# Patient Record
Sex: FEMALE | Race: ASIAN | NOT HISPANIC OR LATINO | Employment: FULL TIME | URBAN - METROPOLITAN AREA
[De-identification: names, ages, dates, MRNs, and addresses within clinical notes are randomized per-mention and may not be internally consistent; named-entity substitution may affect disease eponyms.]

---

## 2017-03-07 ENCOUNTER — TELEPHONE (OUTPATIENT)
Dept: INTERNAL MEDICINE | Facility: CLINIC | Age: 52
End: 2017-03-07

## 2017-03-07 NOTE — LETTER
[facility]  [address], [address]  [place]  [address]      March 7, 2017    Martir Toth                                                                                                                                                       [address]  [place] [address]              Dear Martir,    After reviewing your chart, we show there are a few things you are due for: a mammogram and a colonoscopy.   When we had spoke in the past, you had your mammogram scheduled at [facility] and were going to ask them to send us the results. Did you ever have this done? We did not receive any results from them, but if you are able to provide us with the month and year, and let us know if it was normal or abnormal, then we can update your chart with that information.   As far as your colonoscopy, we know you were waiting on that one until your life was a little less hectic. Is this something you feel you could do now?   If you are ok with having this done, please let us know and we can help you get this scheduled. Our office can be reached at [phone]. Thank you!      Sincerely,      The Office of Re Bender M.D.

## 2017-03-07 NOTE — TELEPHONE ENCOUNTER
Panel Management Review      Patient has the following on her problem list:     Hypertension   Last three blood pressure readings:  BP Readings from Last 3 Encounters:   06/28/16 138/80   05/18/15 118/70   10/09/14 128/70     Blood pressure: Passed    HTN Guidelines:  Age 18-59 BP range:  Less than 140/90  Age 60-85 with Diabetes:  Less than 140/90  Age 60-85 without Diabetes:  less than 150/90      Composite cancer screening  Chart review shows that this patient is due/due soon for the following Mammogram and Colonoscopy  Summary:    Patient is due/failing the following:   COLONOSCOPY and MAMMOGRAM    Action needed:   Needs to complete mammogram and colonoscopy. When we spoke to her last June 2016, she said she had mammogram scheduled at [facility] and would ask them to send us the record after she had this done. She also said she was ok with having colonoscopy, but she was taking care of her mother and did not think she would be able to do it for several months.    Type of outreach:    Sent letter.    Questions for provider review:    None                                                                                                                                    Nina Arrieta [facility]       Chart not routed.

## 2017-07-20 DIAGNOSIS — I10 ESSENTIAL HYPERTENSION WITH GOAL BLOOD PRESSURE LESS THAN 140/90: ICD-10-CM

## 2017-07-20 DIAGNOSIS — N18.1 CKD (CHRONIC KIDNEY DISEASE) STAGE 1, GFR 90 ML/MIN OR GREATER: ICD-10-CM

## 2017-07-20 RX ORDER — LISINOPRIL 10 MG/1
TABLET ORAL
Qty: 30 TABLET | Refills: 0 | Status: SHIPPED | OUTPATIENT
Start: 2017-07-20 | End: 2017-09-25

## 2017-07-20 NOTE — TELEPHONE ENCOUNTER
Lisinopril       Last Written Prescription Date: 6/28/16  Last Fill Quantity: 90, # refills: 3  Last Office Visit with [facility], [facility] or [facility] prescribing provider: 6/28/16       Potassium   Date Value Ref Range Status   06/28/2016 3.8 3.4 - 5.3 mmol/L Final     Creatinine   Date Value Ref Range Status   06/28/2016 0.55 0.52 - 1.04 mg/dL Final     BP Readings from Last 3 Encounters:   06/28/16 138/80   05/18/15 118/70   10/09/14 128/70     Medication is being filled for 1 time refill only due to:  Patient needs to be seen because over due for OV and Labs.     Writer contacted  services to assist in scheduling an appointment and updating patient about the medication refill. Appointment for lab and office visit scheduled for 7/31/17. Patient verbalized understanding. Labs updated.     MACK Kat

## 2017-09-25 ENCOUNTER — OFFICE VISIT (OUTPATIENT)
Dept: INTERNAL MEDICINE | Facility: CLINIC | Age: 52
End: 2017-09-25
Payer: COMMERCIAL

## 2017-09-25 VITALS
OXYGEN SATURATION: 99 % | HEART RATE: 68 BPM | WEIGHT: 114.6 LBS | DIASTOLIC BLOOD PRESSURE: 72 MMHG | TEMPERATURE: 97.7 F | BODY MASS INDEX: 21.09 KG/M2 | HEIGHT: 62 IN | SYSTOLIC BLOOD PRESSURE: 110 MMHG

## 2017-09-25 DIAGNOSIS — Z12.11 SPECIAL SCREENING FOR MALIGNANT NEOPLASMS, COLON: ICD-10-CM

## 2017-09-25 DIAGNOSIS — N18.1 CKD (CHRONIC KIDNEY DISEASE) STAGE 1, GFR 90 ML/MIN OR GREATER: ICD-10-CM

## 2017-09-25 DIAGNOSIS — I10 HTN, GOAL BELOW 140/90: Primary | ICD-10-CM

## 2017-09-25 LAB
ERYTHROCYTE [DISTWIDTH] IN BLOOD BY AUTOMATED COUNT: 12.6 % (ref 10–15)
HCT VFR BLD AUTO: 41.5 % (ref 35–47)
HGB BLD-MCNC: 13.5 G/DL (ref 11.7–15.7)
MCH RBC QN AUTO: 30.4 PG (ref 26.5–33)
MCHC RBC AUTO-ENTMCNC: 32.5 G/DL (ref 31.5–36.5)
MCV RBC AUTO: 94 FL (ref 78–100)
PLATELET # BLD AUTO: 209 10E9/L (ref 150–450)
RBC # BLD AUTO: 4.44 10E12/L (ref 3.8–5.2)
WBC # BLD AUTO: 4.6 10E9/L (ref 4–11)

## 2017-09-25 PROCEDURE — 82043 UR ALBUMIN QUANTITATIVE: CPT | Performed by: INTERNAL MEDICINE

## 2017-09-25 PROCEDURE — 80061 LIPID PANEL: CPT | Performed by: INTERNAL MEDICINE

## 2017-09-25 PROCEDURE — 85027 COMPLETE CBC AUTOMATED: CPT | Performed by: INTERNAL MEDICINE

## 2017-09-25 PROCEDURE — 36415 COLL VENOUS BLD VENIPUNCTURE: CPT | Performed by: INTERNAL MEDICINE

## 2017-09-25 PROCEDURE — 99213 OFFICE O/P EST LOW 20 MIN: CPT | Performed by: INTERNAL MEDICINE

## 2017-09-25 PROCEDURE — 80053 COMPREHEN METABOLIC PANEL: CPT | Performed by: INTERNAL MEDICINE

## 2017-09-25 RX ORDER — LISINOPRIL 10 MG/1
10 TABLET ORAL DAILY
Qty: 90 TABLET | Refills: 1 | Status: SHIPPED | OUTPATIENT
Start: 2017-09-25 | End: 2018-04-23

## 2017-09-25 NOTE — MR AVS SNAPSHOT
After Visit Summary   9/25/2017    Martir Toth    MRN: [id number]           Patient Information     Date Of Birth          1965        Visit Information        Provider Department      9/25/2017 8:45 AM Re Garrett MD; [employer] [facility]        Today's Diagnoses     HTN, goal below 140/90    -  1    Special screening for malignant neoplasms, colon        Essential hypertension with goal blood pressure less than 140/90        CKD (chronic kidney disease) stage 1, GFR 90 ml/min or greater          Care Instructions    Plan:  1.  Labs today   2. Continue same meds, same doses for now   3. Last pap and mammogram 12/6/2016  3. Colonoscopy  Appt. Line [phone].           Follow-ups after your visit        Additional Services     GASTROENTEROLOGY ADULT REF PROCEDURE ONLY       Last Lab Result: Creatinine (mg/dL)       Date                     Value                 06/28/2016               0.55             ----------  Body mass index is 20.96 kg/(m^2).      Patient will be contacted to schedule procedure.     Please be aware that coverage of these services is subject to the terms and limitations of your health insurance plan.  Call member services at your health plan with any benefit or coverage questions.  Any procedures must be performed at a [facility] facility OR coordinated by your clinic's referral office.    Please bring the following with you to your appointment:    (1) Any X-Rays, CTs or MRIs which have been performed.  Contact the facility where they were done to arrange for  prior to your scheduled appointment.    (2) List of current medications   (3) This referral request   (4) Any documents/labs given to you for this referral                  Who to contact     If you have questions or need follow up information about today's clinic visit or your schedule please contact [facility] directly at  "[phone].  Normal or non-critical lab and imaging results will be communicated to you by Tiny Pictureshart, letter or phone within 4 business days after the clinic has received the results. If you do not hear from us within 7 days, please contact the clinic through [employer] Pictures[employer] or phone. If you have a critical or abnormal lab result, we will notify you by phone as soon as possible.  Submit refill requests through zuuka! or call your pharmacy and they will forward the refill request to us. Please allow 3 business days for your refill to be completed.          Additional Information About Your Visit        Tiny Pictureshart Information     zuuka! lets you send messages to your doctor, view your test results, renew your prescriptions, schedule appointments and more. To sign up, go to [email or web address] . Click on \"Log in\" on the left side of the screen, which will take you to the Welcome page. Then click on \"Sign up Now\" on the right side of the page.     You will be asked to enter the access code listed below, as well as some personal information. Please follow the directions to create your username and password.     Your access code is: [email or web address]  Expires: 10/29/2017  9:55 AM     Your access code will  in 90 days. If you need help or a new code, please call your [facility] clinic or [phone].        Care EveryWhere ID     This is your Care EveryWhere ID. This could be used by other organizations to access your [facility] medical records  [id number]        Your Vitals Were     Pulse Temperature Height Pulse Oximetry Breastfeeding? BMI (Body Mass Index)    68 97.7  F (36.5  C) (Oral) 5' 2\" (1.575 m) 99% No 20.96 kg/m2       Blood Pressure from Last 3 Encounters:   17 110/72   16 138/80   05/18/15 118/70    Weight from Last 3 Encounters:   17 114 lb 9.6 oz (52 kg)   16 113 lb (51.3 kg)   05/18/15 113 lb 9.6 oz (51.5 kg)              We Performed the Following     Albumin Random Urine Quantitative " with Creat Ratio     CBC with platelets     Comprehensive metabolic panel     GASTROENTEROLOGY ADULT REF PROCEDURE ONLY     Lipid panel reflex to direct LDL          Today's Medication Changes          These changes are accurate as of: 9/25/17  9:31 AM.  If you have any questions, ask your nurse or doctor.               These medicines have changed or have updated prescriptions.        Dose/Directions    lisinopril 10 MG tablet   Commonly known as:  PRINIVIL/ZESTRIL   This may have changed:  See the new instructions.   Used for:  Essential hypertension with goal blood pressure less than 140/90, CKD (chronic kidney disease) stage 1, GFR 90 ml/min or greater   Changed by:  Re Garrett MD        Dose:  10 mg   Take 1 tablet (10 mg) by mouth daily   Quantity:  90 tablet   Refills:  1            Where to get your medicines      These medications were sent to [id number] - [place], [place] - [address]  [address] [address]     Phone:  [phone]     lisinopril 10 MG tablet                Primary Care Provider Office Phone # Fax #    Re Garrett [phone]       [address] [facility] [address]        Equal Access to Services     [facility] MICHAEL : Hadii aad ku hadasho Soomaali, waaxda luqadaha, qaybta kaalmada adeegyada, mai lucero. So [facility] [phone].    ATENCIÓN: Si habla español, tiene a velasquez disposición servicios gratuitos de asistencia lingüística. Tahira al [phone].    We comply with applicable federal civil rights laws and [place] laws. We do not discriminate on the basis of race, color, national origin, age, disability sex, sexual orientation or gender identity.            Thank you!     Thank you for choosing [facility]  for your care. Our goal is always to provide you with excellent care. Hearing back from our patients is one way we can continue to improve our  services. Please take a few minutes to complete the written survey that you may receive in the mail after your visit with us. Thank you!             Your Updated Medication List - Protect others around you: Learn how to safely use, store and throw away your medicines at www.disposemymeds.org.          This list is accurate as of: 9/25/17  9:31 AM.  Always use your most recent med list.                   Brand Name Dispense Instructions for use Diagnosis    cetirizine-psuedoePHEDrine 5-120 MG per 12 hr tablet    zyrTEC-D    90 tablet    Take 1 tablet by mouth 2 times daily    Other allergic rhinitis       lisinopril 10 MG tablet    PRINIVIL/ZESTRIL    90 tablet    Take 1 tablet (10 mg) by mouth daily    Essential hypertension with goal blood pressure less than 140/90, CKD (chronic kidney disease) stage 1, GFR 90 ml/min or greater

## 2017-09-25 NOTE — PATIENT INSTRUCTIONS
Plan:  1.  Labs today   2. Continue same meds, same doses for now   3. Last pap and mammogram 12/6/2016  3. Colonoscopy  Appt. Line [phone].

## 2017-09-25 NOTE — NURSING NOTE
"Chief Complaint   Patient presents with     Recheck Medication       Initial /72 (BP Location: Right arm, Patient Position: Chair, Cuff Size: Adult Regular)  Pulse 68  Temp 97.7  F (36.5  C) (Oral)  Ht 5' 2\" (1.575 m)  Wt 114 lb 9.6 oz (52 kg)  SpO2 99%  Breastfeeding? No  BMI 20.96 kg/m2 Estimated body mass index is 20.96 kg/(m^2) as calculated from the following:    Height as of this encounter: 5' 2\" (1.575 m).    Weight as of this encounter: 114 lb 9.6 oz (52 kg).  Medication Reconciliation: complete   Colonoscopy discussed today, she reports she plans to complete this in the next few months. She is aware that she will be due for pap in January, goes to [facility] for her care. Will ask them to send us a copy after completion.  Nina Arrieta CMA      "

## 2017-09-25 NOTE — Clinical Note
Pt reports normal mammogram completed 11/2016 at [facility].

## 2017-09-25 NOTE — LETTER
[facility]  [address], [address]  [place] [address]  [phone]        September 26, 2017    Martir Toth  [address]   [place] [address]            Dear Ms. Martir Toth:    The recent blood tests results are in acceptable limits.    Sincerely,    Re Bender MD  Internal Medicine    These are some general explanations for tests  WBC means White Blood Cells  Platelets are small blood cells that help with forming the blood clots along with other blood factors.  Electrolytes are Sodium, Potassium, Calcium, Magnesium, Phosphorus.  Liver tests are: AST, ALT, Bilirubin, Alkaline Phosphatase.  Kidney tests are Creatinine, GFR.  HDL Cholesterol - is the good cholesterol and it is good to have it high.  LDL cholesterol is the bad cholesterol and it is good to have it low.  It is recommended to have LDL less than 130 for people with hypertension and to have it less than 100 for people with heart disease, diabetes and chronic kidney disease.  Thyroid tests are TSH, T4, T3  A1c is a test that gives us an idea about how well was controlled the diabetes for the last 3 months.

## 2017-09-25 NOTE — PROGRESS NOTES
Dr Bender's note      Patient's instructions / PLAN:                                                        Plan:  1.  Labs today   2. Continue same meds, same doses for now   3. Last pap and mammogram 12/6/2016  3. Colonoscopy  Appt. Line [phone].            ASSESSMENT & PLAN:                                                      (I10) HTN, goal below 140/90  (primary encounter diagnosis)  Comment: Controlled    Plan: Comprehensive metabolic panel, CBC with         platelets, Lipid panel reflex to direct LDL,         Albumin Random Urine Quantitative with Creat         Ratio, lisinopril (PRINIVIL/ZESTRIL) 10 MG         tablet            (N18.1) CKD (chronic kidney disease) stage 1, GFR 90 ml/min or greater  Comment:   Plan: lisinopril (PRINIVIL/ZESTRIL) 10 MG tablet            (Z12.11) Special screening for malignant neoplasms, colon  Comment:   Plan: GASTROENTEROLOGY ADULT REF PROCEDURE ONLY               Chief complaint:                                                      HTN    Due to language barrier, an  was present during the history-taking and subsequent discussion (and for part of the physical exam) with this patient.     SUBJECTIVE:   Martir Toth is a 51 year old female who presents to clinic today for the following health issues:      We talked about preventive test: Pap smear mammogram colonoscopy.  She has never had a colonoscopy.  We will order one and I explained her the reasons.  She states she had normal Pap smear and mammogram last year at [facility].  I explained her about NABEEL.  She was hesitant to sign it.  Then she remembered she had the test at [facility] not [facility].  I was able to find those in care everywhere    Care every where :   NEGATIVE FOR INTRAEPITHELIAL LESION OR MALIGNANCY (NIL) 12/06/2016  MAMMOGRAM ASSESSMENT:  ACR 1 Negative  12/06/2016      Hypertension Follow-up      Outpatient blood pressures are being checked at home.  Results are  "good.    Low Salt Diet: reports she does not like salty food, does not add salt to anything.     Amount of exercise or physical activity: None, works all day and doesn't have time for exercise.     Problems taking medications regularly: No    Medication side effects: none  Diet: low salt        Review of Systems:                                                      ROS: negative for fever, chills, cough, wheezes, chest pain, shortness of breath, vomiting, abdominal pain, leg swelling     A 10-point review of systems was obtained.  Those pertinent are above and in the in the Subjective section.  The rest of the systems are negative.           OBJECTIVE:             Physical exam:  Blood pressure 110/72, pulse 68, temperature 97.7  F (36.5  C), temperature source Oral, height 5' 2\" (1.575 m), weight 114 lb 9.6 oz (52 kg), SpO2 99 %, not currently breastfeeding.   NAD, appears comfortable  Skin: no rashes   Neck: supple, no JVD, No thyroidmegaly. Lymph nodes nonpalpable cervical and supraclavicular.  Chest: clear to auscultation bilaterally, good respiratory effort  Heart: S1 S2, RRR, no mgr appreciated  Abdomen: soft, not tender, no hepatosplenomegaly or masses appreciated, no abdominal bruit, present bowel sounds  Extremities: no edema,   Neurologic: A, Ox3, no focal signs appreciated    PMHx: reviewed  Past Medical History:   Diagnosis Date     CKD (chronic kidney disease) stage 1, GFR 90 ml/min or greater      HTN, goal below 140/90      Syncope       PSHx: reviewed  Past Surgical History:   Procedure Laterality Date     NO HISTORY OF SURGERY          Meds: reviewed  Current Outpatient Prescriptions   Medication Sig Dispense Refill     lisinopril (PRINIVIL/ZESTRIL) 10 MG tablet TAKE ONE TABLET BY MOUTH ONE TIME DAILY  30 tablet 0     cetirizine-psuedoePHEDrine (ZYRTEC-D) 5-120 MG per tablet Take 1 tablet by mouth 2 times daily 90 tablet 1       Soc Hx: reviewed  Fam Hx: reviewed        Re Bender MD  Internal " Medicine

## 2017-09-25 NOTE — Clinical Note
Care every where :  NEGATIVE FOR INTRAEPITHELIAL LESION OR MALIGNANCY (NIL) 12/06/2016 MAMMOGRAM ASSESSMENT:  ACR 1 Negative  12/06/2016

## 2017-09-26 LAB
ALBUMIN SERPL-MCNC: 3.7 G/DL (ref 3.4–5)
ALP SERPL-CCNC: 56 U/L (ref 40–150)
ALT SERPL W P-5'-P-CCNC: 27 U/L (ref 0–50)
ANION GAP SERPL CALCULATED.3IONS-SCNC: 7 MMOL/L (ref 3–14)
AST SERPL W P-5'-P-CCNC: 16 U/L (ref 0–45)
BILIRUB SERPL-MCNC: 0.4 MG/DL (ref 0.2–1.3)
BUN SERPL-MCNC: 18 MG/DL (ref 7–30)
CALCIUM SERPL-MCNC: 8.9 MG/DL (ref 8.5–10.1)
CHLORIDE SERPL-SCNC: 108 MMOL/L (ref 94–109)
CHOLEST SERPL-MCNC: 155 MG/DL
CO2 SERPL-SCNC: 29 MMOL/L (ref 20–32)
CREAT SERPL-MCNC: 0.7 MG/DL (ref 0.52–1.04)
CREAT UR-MCNC: 85 MG/DL
GFR SERPL CREATININE-BSD FRML MDRD: 88 ML/MIN/1.7M2
GLUCOSE SERPL-MCNC: 87 MG/DL (ref 70–99)
HDLC SERPL-MCNC: 55 MG/DL
LDLC SERPL CALC-MCNC: 81 MG/DL
MICROALBUMIN UR-MCNC: <5 MG/L
MICROALBUMIN/CREAT UR: NORMAL MG/G CR (ref 0–25)
NONHDLC SERPL-MCNC: 100 MG/DL
POTASSIUM SERPL-SCNC: 4.1 MMOL/L (ref 3.4–5.3)
PROT SERPL-MCNC: 7.3 G/DL (ref 6.8–8.8)
SODIUM SERPL-SCNC: 144 MMOL/L (ref 133–144)
TRIGL SERPL-MCNC: 94 MG/DL

## 2017-10-13 ENCOUNTER — TELEPHONE (OUTPATIENT)
Dept: GASTROENTEROLOGY | Facility: CLINIC | Age: 52
End: 2017-10-13

## 2017-10-13 NOTE — TELEPHONE ENCOUNTER
Third attempt to reach patient to schedule Colonoscopy. Not Scheduled at [facility]. Left Messages.

## 2018-02-11 ENCOUNTER — HEALTH MAINTENANCE LETTER (OUTPATIENT)
Age: 53
End: 2018-02-11

## 2018-02-26 ENCOUNTER — TRANSFERRED RECORDS (OUTPATIENT)
Dept: MULTI SPECIALTY CLINIC | Facility: CLINIC | Age: 53
End: 2018-02-26

## 2018-03-04 ENCOUNTER — HEALTH MAINTENANCE LETTER (OUTPATIENT)
Age: 53
End: 2018-03-04

## 2018-04-23 DIAGNOSIS — N18.1 CKD (CHRONIC KIDNEY DISEASE) STAGE 1, GFR 90 ML/MIN OR GREATER: ICD-10-CM

## 2018-04-23 DIAGNOSIS — I10 HTN, GOAL BELOW 140/90: ICD-10-CM

## 2018-04-24 RX ORDER — LISINOPRIL 10 MG/1
10 TABLET ORAL DAILY
Qty: 90 TABLET | Refills: 1 | Status: SHIPPED | OUTPATIENT
Start: 2018-04-24 | End: 2018-07-30

## 2018-04-24 NOTE — TELEPHONE ENCOUNTER
"Prescription approved per [facility] Refill Protocol.      Requested Prescriptions   Pending Prescriptions Disp Refills     lisinopril (PRINIVIL/ZESTRIL) 10 MG tablet 90 tablet 1     Sig: Take 1 tablet (10 mg) by mouth daily    ACE Inhibitors (Including Combos) Protocol Passed    4/23/2018  4:36 PM       Passed - Blood pressure under 140/90 in past 12 months    BP Readings from Last 3 Encounters:   09/25/17 110/72   06/28/16 138/80   05/18/15 118/70                Passed - Recent (12 mo) or future (30 days) visit within the authorizing provider's specialty    Patient had office visit in the last 12 months or has a visit in the next 30 days with authorizing provider or within the authorizing provider's specialty.  See \"Patient Info\" tab in inbasket, or \"Choose Columns\" in Meds & Orders section of the refill encounter.           Passed - Patient is age 18 or older       Passed - No active pregnancy on record       Passed - Normal serum creatinine on file in past 12 months    Recent Labs   Lab Test  09/25/17   0932   CR  0.70            Passed - Normal serum potassium on file in past 12 months    Recent Labs   Lab Test  09/25/17   0932   POTASSIUM  4.1            Passed - No positive pregnancy test in past 12 months          "

## 2018-07-30 ENCOUNTER — OFFICE VISIT (OUTPATIENT)
Dept: INTERNAL MEDICINE | Facility: CLINIC | Age: 53
End: 2018-07-30
Payer: COMMERCIAL

## 2018-07-30 VITALS
HEIGHT: 62 IN | OXYGEN SATURATION: 98 % | WEIGHT: 114.2 LBS | TEMPERATURE: 98.4 F | HEART RATE: 84 BPM | SYSTOLIC BLOOD PRESSURE: 125 MMHG | DIASTOLIC BLOOD PRESSURE: 77 MMHG | BODY MASS INDEX: 21.02 KG/M2

## 2018-07-30 DIAGNOSIS — J30.9 CHRONIC ALLERGIC RHINITIS, UNSPECIFIED SEASONALITY, UNSPECIFIED TRIGGER: ICD-10-CM

## 2018-07-30 DIAGNOSIS — Z12.31 ENCOUNTER FOR SCREENING MAMMOGRAM FOR BREAST CANCER: ICD-10-CM

## 2018-07-30 DIAGNOSIS — I10 HTN, GOAL BELOW 140/90: ICD-10-CM

## 2018-07-30 DIAGNOSIS — Z23 NEED FOR VACCINATION: ICD-10-CM

## 2018-07-30 DIAGNOSIS — Z00.00 ROUTINE GENERAL MEDICAL EXAMINATION AT A HEALTH CARE FACILITY: Primary | ICD-10-CM

## 2018-07-30 DIAGNOSIS — D12.6 ADENOMATOUS POLYP OF COLON, UNSPECIFIED PART OF COLON: ICD-10-CM

## 2018-07-30 DIAGNOSIS — N18.1 CKD (CHRONIC KIDNEY DISEASE) STAGE 1, GFR 90 ML/MIN OR GREATER: ICD-10-CM

## 2018-07-30 LAB
ALBUMIN SERPL-MCNC: 4 G/DL (ref 3.4–5)
ALP SERPL-CCNC: 73 U/L (ref 40–150)
ALT SERPL W P-5'-P-CCNC: 22 U/L (ref 0–50)
ANION GAP SERPL CALCULATED.3IONS-SCNC: 11 MMOL/L (ref 3–14)
AST SERPL W P-5'-P-CCNC: 21 U/L (ref 0–45)
BILIRUB SERPL-MCNC: 0.4 MG/DL (ref 0.2–1.3)
BUN SERPL-MCNC: 13 MG/DL (ref 7–30)
CALCIUM SERPL-MCNC: 9.1 MG/DL (ref 8.5–10.1)
CHLORIDE SERPL-SCNC: 104 MMOL/L (ref 94–109)
CHOLEST SERPL-MCNC: 186 MG/DL
CO2 SERPL-SCNC: 25 MMOL/L (ref 20–32)
CREAT SERPL-MCNC: 0.61 MG/DL (ref 0.52–1.04)
ERYTHROCYTE [DISTWIDTH] IN BLOOD BY AUTOMATED COUNT: 12.4 % (ref 10–15)
GFR SERPL CREATININE-BSD FRML MDRD: >90 ML/MIN/1.7M2
GLUCOSE SERPL-MCNC: 95 MG/DL (ref 70–99)
HCT VFR BLD AUTO: 45.4 % (ref 35–47)
HDLC SERPL-MCNC: 55 MG/DL
HGB BLD-MCNC: 14.8 G/DL (ref 11.7–15.7)
LDLC SERPL CALC-MCNC: 112 MG/DL
MCH RBC QN AUTO: 30.3 PG (ref 26.5–33)
MCHC RBC AUTO-ENTMCNC: 32.6 G/DL (ref 31.5–36.5)
MCV RBC AUTO: 93 FL (ref 78–100)
NONHDLC SERPL-MCNC: 131 MG/DL
PLATELET # BLD AUTO: 206 10E9/L (ref 150–450)
POTASSIUM SERPL-SCNC: 4.1 MMOL/L (ref 3.4–5.3)
PROT SERPL-MCNC: 8.1 G/DL (ref 6.8–8.8)
RBC # BLD AUTO: 4.88 10E12/L (ref 3.8–5.2)
SODIUM SERPL-SCNC: 140 MMOL/L (ref 133–144)
TRIGL SERPL-MCNC: 97 MG/DL
TSH SERPL DL<=0.005 MIU/L-ACNC: 1.64 MU/L (ref 0.4–4)
WBC # BLD AUTO: 5.7 10E9/L (ref 4–11)

## 2018-07-30 PROCEDURE — 99396 PREV VISIT EST AGE 40-64: CPT | Mod: 25 | Performed by: INTERNAL MEDICINE

## 2018-07-30 PROCEDURE — 87624 HPV HI-RISK TYP POOLED RSLT: CPT | Performed by: INTERNAL MEDICINE

## 2018-07-30 PROCEDURE — 80061 LIPID PANEL: CPT | Performed by: INTERNAL MEDICINE

## 2018-07-30 PROCEDURE — 90715 TDAP VACCINE 7 YRS/> IM: CPT | Performed by: INTERNAL MEDICINE

## 2018-07-30 PROCEDURE — 90471 IMMUNIZATION ADMIN: CPT | Performed by: INTERNAL MEDICINE

## 2018-07-30 PROCEDURE — 84443 ASSAY THYROID STIM HORMONE: CPT | Performed by: INTERNAL MEDICINE

## 2018-07-30 PROCEDURE — G0145 SCR C/V CYTO,THINLAYER,RESCR: HCPCS | Performed by: INTERNAL MEDICINE

## 2018-07-30 PROCEDURE — 85027 COMPLETE CBC AUTOMATED: CPT | Performed by: INTERNAL MEDICINE

## 2018-07-30 PROCEDURE — 99213 OFFICE O/P EST LOW 20 MIN: CPT | Mod: 25 | Performed by: INTERNAL MEDICINE

## 2018-07-30 PROCEDURE — 82043 UR ALBUMIN QUANTITATIVE: CPT | Performed by: INTERNAL MEDICINE

## 2018-07-30 PROCEDURE — 36415 COLL VENOUS BLD VENIPUNCTURE: CPT | Performed by: INTERNAL MEDICINE

## 2018-07-30 PROCEDURE — 80053 COMPREHEN METABOLIC PANEL: CPT | Performed by: INTERNAL MEDICINE

## 2018-07-30 RX ORDER — LISINOPRIL 10 MG/1
10 TABLET ORAL DAILY
Qty: 90 TABLET | Refills: 3 | Status: SHIPPED | OUTPATIENT
Start: 2018-07-30 | End: 2019-08-28

## 2018-07-30 RX ORDER — FLUTICASONE PROPIONATE 50 MCG
1-2 SPRAY, SUSPENSION (ML) NASAL DAILY
Qty: 1 BOTTLE | Refills: 11 | Status: SHIPPED | OUTPATIENT
Start: 2018-07-30 | End: 2020-03-25

## 2018-07-30 RX ORDER — LISINOPRIL 10 MG/1
10 TABLET ORAL DAILY
Qty: 90 TABLET | Refills: 1 | Status: SHIPPED | OUTPATIENT
Start: 2018-07-30 | End: 2018-07-30

## 2018-07-30 NOTE — PROGRESS NOTES
Dr Bender's note    Patient's instructions / PLAN:                                                        Plan:  1. Flonase nasal spray   2. Labs today   3. Continue same meds, same doses for now   4.  Mammogram ( please call [phone] to schedule it) - after Dec 17  5. The following vaccines are recommended for you.   -- Tetanus vaccine with whooping cough    ASSESSMENT & PLAN:                                                      (Z00.00) Routine general medical examination at a health care facility  (primary encounter diagnosis)  Comment:   Plan: Comprehensive metabolic panel, Lipid panel         reflex to direct LDL Fasting, Albumin Random         Urine Quantitative with Creat Ratio, TSH with         free T4 reflex, CBC with platelets, Pap imaged         thin layer screen with HPV - recommended age 30        - 65 years (select HPV order below)            (J30.9) Chronic allergic rhinitis, unspecified seasonality, unspecified trigger  Comment:   Plan: fluticasone (FLONASE) 50 MCG/ACT spray            (N18.1) CKD (chronic kidney disease) stage 1, GFR 90 ml/min or greater  Comment:   Plan: Comprehensive metabolic panel, Lipid panel         reflex to direct LDL Fasting, Albumin Random         Urine Quantitative with Creat Ratio, TSH with         free T4 reflex, CBC with platelets, lisinopril         (PRINIVIL/ZESTRIL) 10 MG tablet, DISCONTINUED:         lisinopril (PRINIVIL/ZESTRIL) 10 MG tablet            (I10) HTN, goal below 140/90  Comment: Controlled    Plan: Comprehensive metabolic panel, Lipid panel         reflex to direct LDL Fasting, Albumin Random         Urine Quantitative with Creat Ratio, TSH with         free T4 reflex, CBC with platelets, lisinopril         (PRINIVIL/ZESTRIL) 10 MG tablet, DISCONTINUED:         lisinopril (PRINIVIL/ZESTRIL) 10 MG tablet            (D12.6) Adenomatous polyp of colon, 2018 - needs colonoscopy 2023  Comment:   Plan:     (Z12.31) Encounter for screening mammogram for  breast cancer  Comment:   Plan: MA Screening Digital Bilateral            (Z23) Need for vaccination  Comment:   Plan: TDAP VACCINE (ADACEL) [13597.002], 1st          Administration  [66492]               Chief Complaint:                                                        Annual exam  Allergies  Follow up chronic medical problems     is present.       SUBJECTIVE:                                                    History of present illness     Allergies  -- she sneezes a lot in the morning and nasal discharge.,Sometimes with bloody tinges  -- she has tried different OTC pills with little help  -- she hasn't used nasal spray     She states she had colonoscopy Feb 2018 at [facility] with polyps and she needs colonoscopy 2023  Care every where: neg mammogram 12/18/2017      ROS:   General: Negative for fever, chills, major weight changes, fatigue  Skin: Negative for rashes, abnormal spots  Eyes: Negative for blurred or double vision  ENT/mouth: Negative for sinuses discomfort, earache, sore throat  Respiratory: Negative for cough, wheezes, chronic lung disease  Cardiovascular: Negative for rest or exertional chest pain, shortness of breath, palpitations, leg edema,   Gastrointestinal: Negative for vomiting, abdominal pain, heartburn, blood in stool, diarrhea, constipation  Genitourinary: Negative for urinary frequency, blood in urine, history of kidney stones  Female: Negative for abnormal vaginal bleeding, vaginal discharge  Neuro: Negative for headaches, numbness, tingling, weakness in arms or legs, history of seizure, recent syncope  Psychiatry: Negative for depression, anxiety, suicidal thoughts  Endo: Negative for known thyroid disease, diabetes.  Hemato/Lymph: Negative for nodes, easy bleeding, history of DVT, blood transfusion  Musculoskeletal: Negative for joint swelling, back pain      PMHx: - reviewed  Past Medical History:   Diagnosis Date     CKD (chronic kidney disease) stage 1, GFR 90  "ml/min or greater      HTN, goal below 140/90      Syncope        PSHx: reviewed  Past Surgical History:   Procedure Laterality Date     NO HISTORY OF SURGERY          Soc Hx: No daily alcohol, no smoking  Social History     Social History     Marital status:      Spouse name: N/A     Number of children: N/A     Years of education: N/A     Occupational History     Not on file.     Social History Main Topics     Smoking status: Never Smoker     Smokeless tobacco: Never Used     Alcohol use No     Drug use: No     Sexual activity: Yes     Partners: Male     Other Topics Concern     Not on file     Social History Narrative        Fam Hx: reviewed  Family History   Problem Relation Age of Onset     Hypertension Mother      Hypertension Father      Hypertension Brother      Diabetes Sister 30         Screening: reviewed      All: reviewed    Meds: reviewed  Current Outpatient Prescriptions   Medication Sig Dispense Refill     lisinopril (PRINIVIL/ZESTRIL) 10 MG tablet Take 1 tablet (10 mg) by mouth daily 90 tablet 1     cetirizine-psuedoePHEDrine (ZYRTEC-D) 5-120 MG per tablet Take 1 tablet by mouth 2 times daily 90 tablet 1       OBJECTIVE:                                                    Physical Exam :  Blood pressure 125/77, pulse 84, temperature 98.4  F (36.9  C), temperature source Oral, height 5' 2\" (1.575 m), weight 114 lb 3.2 oz (51.8 kg), last menstrual period 02/28/2018, SpO2 98 %, not currently breastfeeding.     NAD, appears comfortable  Skin clear, no rashes  HEENT: PERRLA, EOMI, anicteric sclera, pink conjunctiva, external ears appear normal, bilateral tympanic membranes clinically normal, oropharynx normal color.  Neck: supple, no JVD,  no thyroidmegaly  Lymph nodes non palpable in the cervical, supraclavicular axillaries, inguinal areas  Chest: clear to auscultation with good respiratory effort  Cardiac: S1S2, RRR, no mgr appreciated  Abdomen: soft, not tender, not distended, audible bowel " sound, no hepatosplenomegaly, no palpable masses, no abdominal bruits  Extremities: no cyanosis, clubbing or edema.   Neuro: A, Ox3, no focal signs.  Breast exam in supine and erect position: they are symmetrical, no skin changes, no tenderness or nodes on palpation. Nipples are erect, no skin lesions, no discharge on pressure.    Pelvic exam: Normal external genitals, normal appearing perineum, normal appearing urethra,  vaginal mucosa pink, no discharge, Cervix appears normal, Pap smear obtained. On bimanual exam, I did not feel any uterus or ovarian masses, and she denies any tenderness.         Re Bender MD  Internal Medicine        SUBJECTIVE:   CC: Anaya Toth is an 52 year old woman who presents for preventive health visit.     Physical   Annual:     Getting at least 3 servings of Calcium per day:  Yes    Bi-annual eye exam:  NO    Dental care twice a year:  NO    Sleep apnea or symptoms of sleep apnea:  None    Diet:  Regular (no restrictions)    Frequency of exercise:  None    Taking medications regularly:  No    Barriers to taking medications:  None    Additional concerns today:  YES            Today's PHQ-2 Score:   PHQ-2 ( 1999 Pfizer) 7/30/2018   Q1: Little interest or pleasure in doing things 0   Q2: Feeling down, depressed or hopeless 0   PHQ-2 Score 0   Q1: Little interest or pleasure in doing things Not at all   Q2: Feeling down, depressed or hopeless Not at all   PHQ-2 Score 0       Abuse: Current or Past(Physical, Sexual or Emotional)- No  Do you feel safe in your environment - Yes    Social History   Substance Use Topics     Smoking status: Never Smoker     Smokeless tobacco: Never Used     Alcohol use No     Alcohol Use 7/30/2018   If you drink alcohol do you typically have greater than 3 drinks per day OR greater than 7 drinks per week? No       Reviewed orders with patient.  Reviewed health maintenance and updated orders accordingly - Yes          Pertinent mammograms are reviewed  "under the imaging tab.  History of abnormal Pap smear:      Reviewed and updated as needed this visit by clinical staff  Tobacco  Allergies  Meds  Med Hx  Surg Hx  Fam Hx  Soc Hx        Reviewed and updated as needed this visit by Provider            Review of Systems       OBJECTIVE:   There were no vitals taken for this visit.  Physical Exam        COUNSELING:  Reviewed preventive health counseling, as reflected in patient instructions       Regular exercise       Healthy diet/nutrition    BP Readings from Last 1 Encounters:   09/25/17 110/72     Estimated body mass index is 20.96 kg/(m^2) as calculated from the following:    Height as of 9/25/17: 5' 2\" (1.575 m).    Weight as of 9/25/17: 114 lb 9.6 oz (52 kg).           reports that she has never smoked. She has never used smokeless tobacco.      Counseling Resources:  ATP IV Guidelines  Pooled Cohorts Equation Calculator  Breast Cancer Risk Calculator  FRAX Risk Assessment  ICSI Preventive Guidelines  Dietary Guidelines for Americans, 2010  USDA's MyPlate  ASA Prophylaxis  Lung CA Screening    Re Garrett MD  [facility]  Answers for HPI/ROS submitted by the patient on 7/30/2018   PHQ-2 Score: 0    "

## 2018-07-30 NOTE — PATIENT INSTRUCTIONS
Plan:  1. Flonase nasal spray   2. Labs today   3. Continue same meds, same doses for now   4.  Mammogram ( please call [phone] to schedule it) - after Dec 17  5. The following vaccines are recommended for you.   -- Tetanus vaccine with whooping cough      Preventive Health Recommendations  Female Ages 50 - 64    Yearly exam: See your health care provider every year in order to  o Review health changes.   o Discuss preventive care.    o Review your medicines if your doctor has prescribed any.      Get a Pap test every three years (unless you have an abnormal result and your provider advises testing more often).    If you get Pap tests with HPV test, you only need to test every 5 years, unless you have an abnormal result.     You do not need a Pap test if your uterus was removed (hysterectomy) and you have not had cancer.    You should be tested each year for STDs (sexually transmitted diseases) if you're at risk.     Have a mammogram every 1 to 2 years.    Have a colonoscopy at age 50, or have a yearly FIT test (stool test). These exams screen for colon cancer.      Have a cholesterol test every 5 years, or more often if advised.    Have a diabetes test (fasting glucose) every three years. If you are at risk for diabetes, you should have this test more often.     If you are at risk for osteoporosis (brittle bone disease), think about having a bone density scan (DEXA).    Shots: Get a flu shot each year. Get a tetanus shot every 10 years.    Nutrition:     Eat at least 5 servings of fruits and vegetables each day.    Eat whole-grain bread, whole-wheat pasta and brown rice instead of white grains and rice.    Get adequate Calcium and Vitamin D.     Lifestyle    Exercise at least 150 minutes a week (30 minutes a day, 5 days a week). This will help you control your weight and prevent disease.    Limit alcohol to one drink per day.    No smoking.     Wear sunscreen to prevent skin cancer.     See your dentist every  "six months for an exam and cleaning.    See your eye doctor every 1 to 2 years.        HEALTH INSURANCE AND YOUR OUT-OF-POCKET COSTS    How is the physical visit different from an office visit ?    A physical visit is a routine check-up or yearly physical exam. This is sometimes called \"preventive care\".  ( for example, you might have a clinic exam every year or a mammogram every other year). These visits are meant to prevent health problems. They do not include tests or treatments for specific medical issues.     An office visit is a clinic visit to check on a symptoms or to treat a specific concern. This concern may be new or ongoing. Your provider (care team) might order tests or prescribe treatments.     Can I have these services at the same time ?    Yes. If you come in for a physical exam, your provider will want to  talk about any symptoms you are having. This way, we may catch small problems before they become more serious - and you won't have to make another trip to the clinic.     If there is not enough time to talk about your symptoms, your provider will ask you to come back.    If you are treated for a medical issue during a physical exam, we must bill your plan for both services. This is a rule set by insurance companies.     If I receive both services, what are my out-of-pocket costs ?    Some plans will pay for both services. Others ( like Medicare) will not. You will need to pay for any service that your plan won't cover.     Even if your plan covers both services, you may still have out-of-pocket costs. Examples:    -- your plan may offer free physical exams. But you may owe a co-pay and other fees for services received as part of an office visit.   -- you plan may require two co-pays. If you have concerns about the second co-pay, please contact your insurance plan.    To find out what your total costs will be, you will need to call your insurance plan. Ask:    -- what does my plan cover ? Find out " "if your physical visit was covered. What if you also had testing or treatment for a medical concern ?    -- how much do I need to pay ? Ask about co-pays, co-insurance, your deductible and any other out-of-pocket costs.     -- are there limits on what my plan will pay for ? There may be limits on office visits, physical exams and routine tests ( such mammograms, PSA tests and colonoscopies).    About Your Out-Of-Pocket Costs    Out-of-Pocket costs are charges that are not covered by your insurance plan. You will need to pay for them yourself. They may include:    -- Services that your plan will not pay for. Please call your insurance plan to find out what it will cover.     -- A deductible. This is a fixed amount that you pay each year before insurance will pay for services. When you have paid the full amount, then you have \"met\" your deductible. After that, your plan will pay for part or all of your care.     -- Co-pays (co-payments). A co-pay is the amount you must pay at the time of service. It is a flat fee, decided by your insurance plan. Your fee may be different for wellness visits and office visits. Your plan will not cover this fee. The fee will not count toward your deductible.    -- Co-insurance. You may need to pay a percent of the costs for all services you receive. This is called co-insurance. After your clinic visit, your plan will bill you for your share of the cost. This amount may count toward your deductible.     Copyright @ [employer]. All rights reserved. [employer] 73205 - REV 11/12  -------------    Be aware that if you had a regular OBGYN appointment in the last 12 months that it might have been submitted to your insurance as the annual physical exam. Most of the insurances do not cover 2 annual exams in a year.         "

## 2018-07-30 NOTE — Clinical Note
She states she had colonoscopy Feb 2018 at [facility] with polyps and she needs colonoscopy 2023

## 2018-07-30 NOTE — LETTER
[facility]  [address], [address]  [place] [address]  [phone]        August 1, 2018    Anaya Toth  [address] [address]   [place] [address]            Dear Ms. Anaya Toth:      The recent blood tests results are in acceptable limits.    Sincerely,    Re Bender MD  Internal Medicine    These are some general explanations for tests  WBC means White Blood Cells  Platelets are small blood cells that help with forming the blood clots along with other blood factors.  Electrolytes are Sodium, Potassium, Calcium, Magnesium, Phosphorus.  Liver tests are: AST, ALT, Bilirubin, Alkaline Phosphatase.  Kidney tests are Creatinine, GFR.  HDL Cholesterol - is the good cholesterol and it is good to have it high.  LDL cholesterol is the bad cholesterol and it is good to have it low.  It is recommended to have LDL less than 130 for people with hypertension and to have it less than 100 for people with heart disease, diabetes and chronic kidney disease.  Thyroid tests are TSH, T4, T3  A1c is a test that gives us an idea about how well was controlled the diabetes for the last 3 months.

## 2018-07-30 NOTE — MR AVS SNAPSHOT
After Visit Summary   7/30/2018    Anaya Toth    MRN: [id number]           Patient Information     Date Of Birth          1965        Visit Information        Provider Department      7/30/2018 8:05 AM Re Garrett MD; [employer] [facility]        Today's Diagnoses     Routine general medical examination at a health care facility    -  1    Chronic allergic rhinitis, unspecified seasonality, unspecified trigger        CKD (chronic kidney disease) stage 1, GFR 90 ml/min or greater        HTN, goal below 140/90        Adenomatous polyp of colon, 2018 - needs colonoscopy 2023        Encounter for screening mammogram for breast cancer          Care Instructions    Plan:  1. Flonase nasal spray   2. Labs today   3. Continue same meds, same doses for now   4.  Mammogram ( please call [phone] to schedule it) - after Dec 17  5. The following vaccines are recommended for you.   -- Tetanus vaccine with whooping cough      Preventive Health Recommendations  Female Ages 50 - 64    Yearly exam: See your health care provider every year in order to  o Review health changes.   o Discuss preventive care.    o Review your medicines if your doctor has prescribed any.      Get a Pap test every three years (unless you have an abnormal result and your provider advises testing more often).    If you get Pap tests with HPV test, you only need to test every 5 years, unless you have an abnormal result.     You do not need a Pap test if your uterus was removed (hysterectomy) and you have not had cancer.    You should be tested each year for STDs (sexually transmitted diseases) if you're at risk.     Have a mammogram every 1 to 2 years.    Have a colonoscopy at age 50, or have a yearly FIT test (stool test). These exams screen for colon cancer.      Have a cholesterol test every 5 years, or more often if advised.    Have a diabetes test (fasting glucose)  "every three years. If you are at risk for diabetes, you should have this test more often.     If you are at risk for osteoporosis (brittle bone disease), think about having a bone density scan (DEXA).    Shots: Get a flu shot each year. Get a tetanus shot every 10 years.    Nutrition:     Eat at least 5 servings of fruits and vegetables each day.    Eat whole-grain bread, whole-wheat pasta and brown rice instead of white grains and rice.    Get adequate Calcium and Vitamin D.     Lifestyle    Exercise at least 150 minutes a week (30 minutes a day, 5 days a week). This will help you control your weight and prevent disease.    Limit alcohol to one drink per day.    No smoking.     Wear sunscreen to prevent skin cancer.     See your dentist every six months for an exam and cleaning.    See your eye doctor every 1 to 2 years.        HEALTH INSURANCE AND YOUR OUT-OF-POCKET COSTS    How is the physical visit different from an office visit ?    A physical visit is a routine check-up or yearly physical exam. This is sometimes called \"preventive care\".  ( for example, you might have a clinic exam every year or a mammogram every other year). These visits are meant to prevent health problems. They do not include tests or treatments for specific medical issues.     An office visit is a clinic visit to check on a symptoms or to treat a specific concern. This concern may be new or ongoing. Your provider (care team) might order tests or prescribe treatments.     Can I have these services at the same time ?    Yes. If you come in for a physical exam, your provider will want to  talk about any symptoms you are having. This way, we may catch small problems before they become more serious - and you won't have to make another trip to the clinic.     If there is not enough time to talk about your symptoms, your provider will ask you to come back.    If you are treated for a medical issue during a physical exam, we must bill your plan " "for both services. This is a rule set by insurance companies.     If I receive both services, what are my out-of-pocket costs ?    Some plans will pay for both services. Others ( like Medicare) will not. You will need to pay for any service that your plan won't cover.     Even if your plan covers both services, you may still have out-of-pocket costs. Examples:    -- your plan may offer free physical exams. But you may owe a co-pay and other fees for services received as part of an office visit.   -- you plan may require two co-pays. If you have concerns about the second co-pay, please contact your insurance plan.    To find out what your total costs will be, you will need to call your insurance plan. Ask:    -- what does my plan cover ? Find out if your physical visit was covered. What if you also had testing or treatment for a medical concern ?    -- how much do I need to pay ? Ask about co-pays, co-insurance, your deductible and any other out-of-pocket costs.     -- are there limits on what my plan will pay for ? There may be limits on office visits, physical exams and routine tests ( such mammograms, PSA tests and colonoscopies).    About Your Out-Of-Pocket Costs    Out-of-Pocket costs are charges that are not covered by your insurance plan. You will need to pay for them yourself. They may include:    -- Services that your plan will not pay for. Please call your insurance plan to find out what it will cover.     -- A deductible. This is a fixed amount that you pay each year before insurance will pay for services. When you have paid the full amount, then you have \"met\" your deductible. After that, your plan will pay for part or all of your care.     -- Co-pays (co-payments). A co-pay is the amount you must pay at the time of service. It is a flat fee, decided by your insurance plan. Your fee may be different for wellness visits and office visits. Your plan will not cover this fee. The fee will not count toward " "your deductible.    -- Co-insurance. You may need to pay a percent of the costs for all services you receive. This is called co-insurance. After your clinic visit, your plan will bill you for your share of the cost. This amount may count toward your deductible.     Copyright @ [facility]. All rights reserved. Ash [address] - REV 11/12  -------------    Be aware that if you had a regular OBGYN appointment in the last 12 months that it might have been submitted to your insurance as the annual physical exam. Most of the insurances do not cover 2 annual exams in a year.                 Follow-ups after your visit        Future tests that were ordered for you today     Open Future Orders        Priority Expected Expires Ordered    MA Screening Digital Bilateral Routine  2/27/2019 7/30/2018            Who to contact     If you have questions or need follow up information about today's clinic visit or your schedule please contact [facility] directly at [phone].  Normal or non-critical lab and imaging results will be communicated to you by MyChart, letter or phone within 4 business days after the clinic has received the results. If you do not hear from us within 7 days, please contact the clinic through [employer] or phone. If you have a critical or abnormal lab result, we will notify you by phone as soon as possible.  Submit refill requests through [employer] or call your pharmacy and they will forward the refill request to us. Please allow 3 business days for your refill to be completed.          Additional Information About Your Visit        Care EveryWhere ID     This is your Care EveryWhere ID. This could be used by other organizations to access your [facility] medical records  [id number]        Your Vitals Were     Pulse Temperature Height Last Period Pulse Oximetry Breastfeeding?    84 98.4  F (36.9  C) (Oral) 5' 2\" (1.575 m) 02/28/2018 98% No    BMI (Body Mass Index)                "    20.89 kg/m2            Blood Pressure from Last 3 Encounters:   07/30/18 125/77   09/25/17 110/72   06/28/16 138/80    Weight from Last 3 Encounters:   07/30/18 114 lb 3.2 oz (51.8 kg)   09/25/17 114 lb 9.6 oz (52 kg)   06/28/16 113 lb (51.3 kg)              We Performed the Following     Albumin Random Urine Quantitative with Creat Ratio     CBC with platelets     Comprehensive metabolic panel     Lipid panel reflex to direct LDL Fasting     TSH with free T4 reflex          Today's Medication Changes          These changes are accurate as of 7/30/18  9:28 AM.  If you have any questions, ask your nurse or doctor.               Start taking these medicines.        Dose/Directions    fluticasone 50 MCG/ACT spray   Commonly known as:  FLONASE   Used for:  Chronic allergic rhinitis, unspecified seasonality, unspecified trigger   Started by:  Re Garrett MD        Dose:  1-2 spray   Spray 1-2 sprays into both nostrils daily   Quantity:  1 Bottle   Refills:  11       lisinopril 10 MG tablet   Commonly known as:  PRINIVIL/ZESTRIL   Used for:  CKD (chronic kidney disease) stage 1, GFR 90 ml/min or greater, HTN, goal below 140/90   Started by:  Re Garrett MD        Dose:  10 mg   Take 1 tablet (10 mg) by mouth daily   Quantity:  90 tablet   Refills:  3            Where to get your medicines      These medications were sent to [facility] [address] [place], [place] [address] [address] [address]     Phone:  [phone]     fluticasone 50 MCG/ACT spray    lisinopril 10 MG tablet                Primary Care Provider Office Phone # Fax #    Re Garrett [phone]       [address] [facility] [address]        Equal Access to Services     [facility] MICHAEL AH: Nubia shay Sofela, waaxda luqadaha, qaybta kaalmada adeegyada, mai lucero. So [facility] [phone].    ATENCIÓN: Angely gutiérrez  español, tiene a velasquez disposición servicios gratuitos de asistencia lingüística. Tahira ng [phone].    We comply with applicable federal civil rights laws and [place] laws. We do not discriminate on the basis of race, color, national origin, age, disability, sex, sexual orientation, or gender identity.            Thank you!     Thank you for choosing [facility]  for your care. Our goal is always to provide you with excellent care. Hearing back from our patients is one way we can continue to improve our services. Please take a few minutes to complete the written survey that you may receive in the mail after your visit with us. Thank you!             Your Updated Medication List - Protect others around you: Learn how to safely use, store and throw away your medicines at www.disposemymeds.org.          This list is accurate as of 7/30/18  9:28 AM.  Always use your most recent med list.                   Brand Name Dispense Instructions for use Diagnosis    cetirizine-pseudoePHEDrine 5-120 MG per 12 hr tablet    zyrTEC-D    90 tablet    Take 1 tablet by mouth 2 times daily    Other allergic rhinitis       fluticasone 50 MCG/ACT spray    FLONASE    1 Bottle    Spray 1-2 sprays into both nostrils daily    Chronic allergic rhinitis, unspecified seasonality, unspecified trigger       lisinopril 10 MG tablet    PRINIVIL/ZESTRIL    90 tablet    Take 1 tablet (10 mg) by mouth daily    CKD (chronic kidney disease) stage 1, GFR 90 ml/min or greater, HTN, goal below 140/90

## 2018-07-30 NOTE — LETTER
August 9, 2018    Anaya Toth  [address]  [place] [address]    Dear Anaya,  We are happy to inform you that your PAP smear result from 07/30/18 is normal.  We are now able to do a follow up test on PAP smears. The DNA test is for HPV (Human Papilloma Virus). Cervical cancer is closely linked with certain types of HPV. Your results showed no evidence of high risk HPV.  Therefore we recommend you return in 5 years for your next pap smear and HPV test.  You will still need to return to the clinic every year for an annual exam and other preventive tests.  Please contact the clinic at [phone] with any questions.  Sincerely,    Re Garrett MD/[facility]

## 2018-07-31 LAB
CREAT UR-MCNC: 36 MG/DL
MICROALBUMIN UR-MCNC: <5 MG/L
MICROALBUMIN/CREAT UR: NORMAL MG/G CR (ref 0–25)

## 2018-08-01 LAB
COPATH REPORT: NORMAL
PAP: NORMAL

## 2018-08-03 LAB
FINAL DIAGNOSIS: NORMAL
HPV HR 12 DNA CVX QL NAA+PROBE: NEGATIVE
HPV16 DNA SPEC QL NAA+PROBE: NEGATIVE
HPV18 DNA SPEC QL NAA+PROBE: NEGATIVE
SPECIMEN DESCRIPTION: NORMAL
SPECIMEN SOURCE CVX/VAG CYTO: NORMAL

## 2018-10-23 ENCOUNTER — OFFICE VISIT (OUTPATIENT)
Dept: INTERNAL MEDICINE | Facility: CLINIC | Age: 53
End: 2018-10-23
Payer: COMMERCIAL

## 2018-10-23 VITALS
TEMPERATURE: 98.1 F | BODY MASS INDEX: 21.29 KG/M2 | HEART RATE: 79 BPM | WEIGHT: 115.7 LBS | HEIGHT: 62 IN | RESPIRATION RATE: 16 BRPM | DIASTOLIC BLOOD PRESSURE: 72 MMHG | SYSTOLIC BLOOD PRESSURE: 104 MMHG | OXYGEN SATURATION: 99 %

## 2018-10-23 DIAGNOSIS — Z01.818 PREOP GENERAL PHYSICAL EXAM: Primary | ICD-10-CM

## 2018-10-23 PROCEDURE — 99214 OFFICE O/P EST MOD 30 MIN: CPT | Performed by: NURSE PRACTITIONER

## 2018-10-23 NOTE — MR AVS SNAPSHOT
After Visit Summary   10/23/2018    Anaya Toth    MRN: [id number]           Patient Information     Date Of Birth          1965        Visit Information        Provider Department      10/23/2018 8:30 AM Olesya Richards NP; LANGUAGE [facility]        Today's Diagnoses     Preop general physical exam    -  1      Care Instructions      Before Your Surgery      Call your surgeon if there is any change in your health. This includes signs of a cold or flu (such as a sore throat, runny nose, cough, rash or fever).    Do not smoke, drink alcohol or take over the counter medicine (unless your surgeon or primary care doctor tells you to) for the 24 hours before and after surgery.    If you take prescribed drugs: Follow your doctor s orders about which medicines to take and which to stop until after surgery.    Eating and drinking prior to surgery: follow the instructions from your surgeon    Take a shower or bath the night before surgery. Use the soap your surgeon gave you to gently clean your skin. If you do not have soap from your surgeon, use your regular soap. Do not shave or scrub the surgery site.  Wear clean pajamas and have clean sheets on your bed.           Follow-ups after your visit        Follow-up notes from your care team     Return if symptoms worsen or fail to improve.      Who to contact     If you have questions or need follow up information about today's clinic visit or your schedule please contact [facility] directly at [phone].  Normal or non-critical lab and imaging results will be communicated to you by MyChart, letter or phone within 4 business days after the clinic has received the results. If you do not hear from us within 7 days, please contact the clinic through [employer] or phone. If you have a critical or abnormal lab result, we will notify you by phone as soon as possible.  Submit refill requests through [employer] or  "call your pharmacy and they will forward the refill request to us. Please allow 3 business days for your refill to be completed.          Additional Information About Your Visit        Care EveryWhere ID     This is your Care EveryWhere ID. This could be used by other organizations to access your [facility] medical records  [id number]        Your Vitals Were     Pulse Temperature Respirations Height Last Period Pulse Oximetry    79 98.1  F (36.7  C) (Oral) 16 5' 2\" (1.575 m) 10/15/2018 (Exact Date) 99%    BMI (Body Mass Index)                   21.16 kg/m2            Blood Pressure from Last 3 Encounters:   10/23/18 104/72   07/30/18 125/77   09/25/17 110/72    Weight from Last 3 Encounters:   10/23/18 115 lb 11.2 oz (52.5 kg)   07/30/18 114 lb 3.2 oz (51.8 kg)   09/25/17 114 lb 9.6 oz (52 kg)              Today, you had the following     No orders found for display       Primary Care Provider Office Phone # Fax #    Re Garrett [phone]       [address] [facility] [address]        Equal Access to Services     [facility] : Hadii maren pan hadasho Soomaali, waaxda luqadaha, qaybta kaalmada adeegyada, mai lucero. So [facility] [phone].    ATENCIÓN: Si habla español, tiene a velasquez disposición servicios gratuitos de asistencia lingüística. Jackelineame al [phone].    We comply with applicable federal civil rights laws and [place] laws. We do not discriminate on the basis of race, color, national origin, age, disability, sex, sexual orientation, or gender identity.            Thank you!     Thank you for choosing [facility]  for your care. Our goal is always to provide you with excellent care. Hearing back from our patients is one way we can continue to improve our services. Please take a few minutes to complete the written survey that you may receive in the mail after your visit with us. Thank you!             Your Updated " Medication List - Protect others around you: Learn how to safely use, store and throw away your medicines at www.disposemymeds.org.          This list is accurate as of 10/23/18  9:24 AM.  Always use your most recent med list.                   Brand Name Dispense Instructions for use Diagnosis    cetirizine-pseudoePHEDrine 5-120 MG per 12 hr tablet    zyrTEC-D    90 tablet    Take 1 tablet by mouth 2 times daily    Other allergic rhinitis       fluticasone 50 MCG/ACT spray    FLONASE    1 Bottle    Spray 1-2 sprays into both nostrils daily    Chronic allergic rhinitis, unspecified seasonality, unspecified trigger       lisinopril 10 MG tablet    PRINIVIL/ZESTRIL    90 tablet    Take 1 tablet (10 mg) by mouth daily    CKD (chronic kidney disease) stage 1, GFR 90 ml/min or greater, HTN, goal below 140/90

## 2018-10-23 NOTE — PROGRESS NOTES
[place] [address]  [facility] [address]  [phone]  Dept: [phone]    PRE-OP EVALUATION:  Today's date: 10/23/2018    Anaya Toth (: 1965) presents for pre-operative evaluation assessment as requested by Dr. Malika Garcia.  She requires evaluation and anesthesia risk assessment prior to undergoing surgery/procedure for treatment of bilateral cataracts .    Fax number for surgical facility: [phone]  Primary Physician: Re Garrett  Type of Anesthesia Anticipated: Local with MAC    Patient has a Health Care Directive or Living Will:  NO    Preop Questions 10/23/2018   Who is doing your surgery? jose garcia   What are you having done? eyes surgery   Date of Surgery/Procedure:   &   Facility or Hospital where procedure/surgery will be performed: [facility]   1.  Do you have a history of Heart attack, stroke, stent, coronary bypass surgery, or other heart surgery? No   2.  Do you ever have any pain or discomfort in your chest? No   3.  Do you have a history of  Heart Failure? No   4.   Are you troubled by shortness of breath when:  walking on a level surface, or up a slight hill, or at night? No   5.  Do you currently have a cold, bronchitis or other respiratory infection? No   6.  Do you have a cough, shortness of breath, or wheezing? No   7.  Do you sometimes get pains in the calves of your legs when you walk? No   8. Do you or anyone in your family have previous history of blood clots? No   9.  Do you or does anyone in your family have a serious bleeding problem such as prolonged bleeding following surgeries or cuts? No   10. Have you ever had problems with anemia or been told to take iron pills? No   11. Have you had any abnormal blood loss such as black, tarry or bloody stools, or abnormal vaginal bleeding? No   12. Have you ever had a blood transfusion? No   13. Have you or any of your  relatives ever had problems with anesthesia? No   14. Do you have sleep apnea, excessive snoring or daytime drowsiness? No   15. Do you have any prosthetic heart valves? No   16. Do you have prosthetic joints? No   17. Is there any chance that you may be pregnant? No         HPI:     HPI related to upcoming procedure: bilateral cataracts      See problem list for active medical problems.  Problems all longstanding and stable, except as noted/documented.  See ROS for pertinent symptoms related to these conditions.                                                                                                                                                          .    MEDICAL HISTORY:     Patient Active Problem List    Diagnosis Date Noted     CKD (chronic kidney disease) stage 1, GFR 90 ml/min or greater      Priority: Medium     Abdominal pain, generalized 08/27/2014     Priority: Medium     Syncope 08/27/2014     Priority: Medium     HTN, goal below 140/90 08/27/2014     Priority: Medium      Past Medical History:   Diagnosis Date     CKD (chronic kidney disease) stage 1, GFR 90 ml/min or greater      HTN, goal below 140/90      Syncope      Past Surgical History:   Procedure Laterality Date     NO HISTORY OF SURGERY       Current Outpatient Prescriptions   Medication Sig Dispense Refill     cetirizine-psuedoePHEDrine (ZYRTEC-D) 5-120 MG per tablet Take 1 tablet by mouth 2 times daily 90 tablet 1     fluticasone (FLONASE) 50 MCG/ACT spray Spray 1-2 sprays into both nostrils daily 1 Bottle 11     lisinopril (PRINIVIL/ZESTRIL) 10 MG tablet Take 1 tablet (10 mg) by mouth daily 90 tablet 3     OTC products: None, except as noted above    No Known Allergies   Latex Allergy: NO    Social History   Substance Use Topics     Smoking status: Never Smoker     Smokeless tobacco: Never Used     Alcohol use No     History   Drug Use No       REVIEW OF SYSTEMS:   CONSTITUTIONAL: NEGATIVE for fever, chills, change in  "weight  ENT/MOUTH: NEGATIVE for ear, mouth and throat problems  RESP: NEGATIVE for significant cough or SOB  CV: NEGATIVE for chest pain, palpitations or peripheral edema  GI: NEGATIVE for nausea, abdominal pain, heartburn, or change in bowel habits  : abnormal menstrual cycles  PSYCHIATRIC: NEGATIVE for changes in mood or affect  ROS otherwise negative    EXAM:   /72 (BP Location: Right arm, Patient Position: Sitting, Cuff Size: Adult Regular)  Pulse 79  Temp 98.1  F (36.7  C) (Oral)  Resp 16  Ht 5' 2\" (1.575 m)  Wt 115 lb 11.2 oz (52.5 kg)  LMP 10/15/2018 (Exact Date)  SpO2 99%  BMI 21.16 kg/m2    GENERAL APPEARANCE: healthy, alert and no distress     NECK: no adenopathy, no asymmetry, masses, or scars and thyroid normal to palpation     RESP: lungs clear to auscultation - no rales, rhonchi or wheezes     CV: regular rates and rhythm, normal S1 S2, no S3 or S4 and no murmur, click or rub     ABDOMEN:  soft, nontender, no HSM or masses and bowel sounds normal     MS: extremities normal- no gross deformities noted, no evidence of inflammation in joints, FROM in all extremities.     NEURO: Normal strength and tone, sensory exam grossly normal, mentation intact and speech normal     PSYCH: mentation appears normal. and affect normal/bright    DIAGNOSTICS:   No labs or EKG required for low risk surgery (cataract, skin procedure, breast biopsy, etc)    Recent Labs   Lab Test  07/30/18   0953  09/25/17   0932   HGB  14.8  13.5   PLT  206  209   NA  140  144   POTASSIUM  4.1  4.1   CR  0.61  0.70        IMPRESSION:   Reason for surgery/procedure: bilateral cataracts    The proposed surgical procedure is considered LOW risk.    REVISED CARDIAC RISK INDEX  The patient has the following serious cardiovascular risks for perioperative complications such as (MI, PE, VFib and 3  AV Block):  No serious cardiac risks  INTERPRETATION: 0 risks: Class I (very low risk - 0.4% complication rate)    The patient has the " following additional risks for perioperative complications:  No identified additional risks      ICD-10-CM    1. Preop general physical exam Z01.818        RECOMMENDATIONS:         --Patient is to take all scheduled medications on the day of surgery EXCEPT for modifications listed below.    APPROVAL GIVEN to proceed with proposed procedure, without further diagnostic evaluation       Signed Electronically by: Olesya Richards NP    Copy of this evaluation report is provided to requesting physician.    [facility] Preop Guidelines    Revised Cardiac Risk Index

## 2019-08-14 DIAGNOSIS — S92.919A CLOSED NONDISPLACED FRACTURE OF PHALANX OF TOE, UNSPECIFIED LATERALITY, UNSPECIFIED TOE, INITIAL ENCOUNTER: Primary | ICD-10-CM

## 2019-08-14 DIAGNOSIS — I10 HTN, GOAL BELOW 140/90: ICD-10-CM

## 2019-08-14 DIAGNOSIS — N18.1 CKD (CHRONIC KIDNEY DISEASE) STAGE 1, GFR 90 ML/MIN OR GREATER: ICD-10-CM

## 2019-08-14 NOTE — TELEPHONE ENCOUNTER
"Requested Prescriptions   Pending Prescriptions Disp Refills     lisinopril (PRINIVIL/ZESTRIL) 10 MG tablet [Pharmacy Med Name: Lisinopril Oral Tablet 10 MG] 90 tablet 0     Sig: Take 1 tablet (10 mg) by mouth daily   Last Written Prescription Date:  07/30/2018  Last Fill Quantity: 90,  # refills: 03   Last office visit: 10/23/2018 with prescribing provider:     Future Office Visit:      ACE Inhibitors (Including Combos) Protocol Failed - 8/14/2019  7:02 AM        Failed - Normal serum creatinine on file in past 12 months     Recent Labs   Lab Test 07/30/18  0953   CR 0.61             Failed - Normal serum potassium on file in past 12 months     Recent Labs   Lab Test 07/30/18  0953   POTASSIUM 4.1             Passed - Blood pressure under 140/90 in past 12 months     BP Readings from Last 3 Encounters:   10/23/18 104/72   07/30/18 125/77   09/25/17 110/72                 Passed - Recent (12 mo) or future (30 days) visit within the authorizing provider's specialty     Patient had office visit in the last 12 months or has a visit in the next 30 days with authorizing provider or within the authorizing provider's specialty.  See \"Patient Info\" tab in inbasket, or \"Choose Columns\" in Meds & Orders section of the refill encounter.              Passed - Medication is active on med list        Passed - Patient is age 18 or older        Passed - No active pregnancy on record        Passed - No positive pregnancy test within past 12 months        "

## 2019-08-14 NOTE — LETTER
[place] [address]  [facility] [address]  Phone: [phone]        August 15, 2019      Anaya AARTI Janey                                                                                                                                [address]  [place] [address]            Dear Ms. Toth,    We are concerned about your health care.  We recently provided you with a medication refill.  Many medications require routine follow-up with your Doctor.      At this time we ask that: You come to your clinic for routine labs for medication monitoring.    (Any necessary labs will be done at your office visit.)    Your prescription: Has been refilled for 1 month so you may have time for the above noted follow-up.      Thank you,      Dr. Bender/ jm

## 2019-08-15 RX ORDER — LISINOPRIL 10 MG/1
10 TABLET ORAL DAILY
Qty: 90 TABLET | Refills: 0 | Status: SHIPPED | OUTPATIENT
Start: 2019-08-15 | End: 2019-08-28

## 2019-08-15 NOTE — TELEPHONE ENCOUNTER
Medication is being filled for 1 time refill only due to:  Patient needs to be seen because it has been more than one year since last visit.  Letter sent. . RACHNA Graham R.N.

## 2019-08-16 ENCOUNTER — APPOINTMENT (OUTPATIENT)
Dept: GENERAL RADIOLOGY | Facility: CLINIC | Age: 54
End: 2019-08-16
Attending: EMERGENCY MEDICINE
Payer: OTHER MISCELLANEOUS

## 2019-08-16 ENCOUNTER — HOSPITAL ENCOUNTER (EMERGENCY)
Facility: CLINIC | Age: 54
Discharge: HOME | End: 2019-08-16
Attending: EMERGENCY MEDICINE | Admitting: EMERGENCY MEDICINE
Payer: OTHER MISCELLANEOUS

## 2019-08-16 VITALS
SYSTOLIC BLOOD PRESSURE: 159 MMHG | HEIGHT: 62 IN | OXYGEN SATURATION: 100 % | TEMPERATURE: 98.6 F | BODY MASS INDEX: 23.19 KG/M2 | DIASTOLIC BLOOD PRESSURE: 75 MMHG | WEIGHT: 126 LBS | HEART RATE: 76 BPM | RESPIRATION RATE: 18 BRPM

## 2019-08-16 DIAGNOSIS — S92.536A: ICD-10-CM

## 2019-08-16 DIAGNOSIS — S92.426A CLOSED NONDISPLACED FRACTURE OF DISTAL PHALANX OF GREAT TOE, UNSPECIFIED LATERALITY, INITIAL ENCOUNTER: ICD-10-CM

## 2019-08-16 PROCEDURE — 99283 EMERGENCY DEPT VISIT LOW MDM: CPT | Mod: 25

## 2019-08-16 PROCEDURE — 28510 TREATMENT OF TOE FRACTURE: CPT | Mod: T4

## 2019-08-16 PROCEDURE — 25000132 ZZH RX MED GY IP 250 OP 250 PS 637: Performed by: EMERGENCY MEDICINE

## 2019-08-16 PROCEDURE — 28490 TREAT BIG TOE FRACTURE: CPT | Mod: TA

## 2019-08-16 PROCEDURE — 73630 X-RAY EXAM OF FOOT: CPT | Mod: LT

## 2019-08-16 RX ORDER — OXYCODONE AND ACETAMINOPHEN 5; 325 MG/1; MG/1
1 TABLET ORAL ONCE
Status: COMPLETED | OUTPATIENT
Start: 2019-08-16 | End: 2019-08-16

## 2019-08-16 RX ORDER — HYDROCODONE BITARTRATE AND ACETAMINOPHEN 5; 325 MG/1; MG/1
1 TABLET ORAL EVERY 6 HOURS PRN
Qty: 8 TABLET | Refills: 0 | Status: SHIPPED | OUTPATIENT
Start: 2019-08-16 | End: 2019-08-28

## 2019-08-16 RX ORDER — ONDANSETRON 4 MG/1
4 TABLET, ORALLY DISINTEGRATING ORAL EVERY 8 HOURS PRN
Qty: 10 TABLET | Refills: 0 | Status: SHIPPED | OUTPATIENT
Start: 2019-08-16 | End: 2020-01-14

## 2019-08-16 RX ADMIN — OXYCODONE HYDROCHLORIDE AND ACETAMINOPHEN 1 TABLET: 5; 325 TABLET ORAL at 00:33

## 2019-08-16 ASSESSMENT — ENCOUNTER SYMPTOMS: ARTHRALGIAS: 1

## 2019-08-16 ASSESSMENT — MIFFLIN-ST. JEOR: SCORE: 1124.78

## 2019-08-16 NOTE — ED PROVIDER NOTES
"  History     Chief Complaint:    Foot Pain      The history is provided by the patient and a relative. A  was used (son and daughter).      Anaya Toth is a 53 year old female who presents to the emergency department today with foot pain. The patient states she was at work when a forklift drove by and ran over her left foot. She states pain to the left foot.     Allergies:  No Known Allergies     Medications:    Zyrtec  Flonase  Prinivil    Past Medical History:    CKD  Hypertension  Syncope    Past Surgical History:    History reviewed. No pertinent surgical history.    Family History:    Mother - hypertension  Father - hypertension  Brother - hypertension  Sister - diabetes    Social History:  The patient was accompanied to the ED by her family.  Smoking Status: never  Smokeless Tobacco: never  Alcohol Use: no    Marital Status:       Review of Systems   Musculoskeletal: Positive for arthralgias.   All other systems reviewed and are negative.      Physical Exam   First Vitals:  BP: (!) 159/75  Pulse: 76  Temp: 98.6  F (37  C)  Resp: 18  Height: 157.5 cm (5' 2\")  Weight: 57.2 kg (126 lb)  SpO2: 100 %      Physical Exam  Vitals: reviewed by me  General: Pt seen on [facility], cooperative, and alert to conversation  Eyes: Tracking well, clear conjunctiva BL  ENT: MMM, midline trachea.   Lungs: No tachypnea, no accessory muscle use. No respiratory distress.   CV: Rate as above, regular rhythm.    MSK: no peripheral edema or joint effusion.    BLE with SILT and 5/5 motor  + trauma noted to distal digits on left foot, abrasions only.    Minimal tenderness to palpation to great toe as well as pinky toe, no skin breaks, nailbed appears to be intact, distal toes are warm and well-perfused.  Skin: No rash, normal turgor and temperature  Neuro: Clear speech and no facial droop.  Psych: Not RIS, no e/o AH/VH      Emergency Department Course     Imaging:  Radiographic findings " were communicated with the patient and family who voiced understanding of the findings.    XR Foot, G/E, 3 views, left:   IMPRESSION: Minimally comminuted and displaced tuft fracture involving  the distal phalanx of the left great toe. Irregular transverse lucency  through the distal phalanx of the fifth toe which may be an incomplete  segmentation anomaly of the fifth toe phalanges or a fracture.  Recommend clinical correlation for pain and injury at this site. No  other fractures, as per radiology.     Interventions:  0033 Percocet 1 tablet PO    Emergency Department Course:  Nursing notes and vitals reviewed. (0125) I performed an exam of the patient as documented above.     The patient was sent for a foot XR while in the emergency department, findings above.     Findings and plan explained to the Patient and son and daughter. Patient discharged home with instructions regarding supportive care, medications, and reasons to return. The importance of close follow-up was reviewed. The patient was prescribed Norco and Zofran ODT.    I personally reviewed the laboratory results with the Patient and son and daughter and answered all related questions prior to discharge.      Impression & Plan      Medical Decision Making:  Anaya Toth is a 53 year old female who presents to the ER with mild crash injury to distal aspect of her left foot. XR shows distal phalanx with a non displaced fracture in the great toe as well as the pinky toe. Her skin is intact, there is no evidence of an open fracture, her nailbeds are intact as well. Has very minor abrasions, but otherwise doing well. No ankle pain, nor foot pain. I do think she will do well with the hard sole shoe, pain control, and I have done my best to fill out the paperwork associated with her workers-comp though of course she will need to follow-up with her regular doctor to get it fully filled out. Ambulatory with strong and steady gait, a very traumatic episode,  but thankfully has only 2 small phalanx fractures that do not require intervention here apart from hard sole shoe. Will discharge as above with primary care follow-up.    Diagnosis:    ICD-10-CM    1. Closed nondisplaced fracture of distal phalanx of great toe, unspecified laterality, initial encounter S92.426A    2. Closed nondisplaced fracture of distal phalanx of lesser toe, unspecified laterality, initial encounter S92.536A        Disposition:  discharged to home    Discharge Medications:  Discharge Medication List as of 8/16/2019  1:58 AM      START taking these medications    Details   HYDROcodone-acetaminophen (NORCO) 5-325 MG tablet Take 1 tablet by mouth every 6 hours as needed, Disp-8 tablet, R-0, Local Print      ondansetron (ZOFRAN ODT) 4 MG ODT tab Take 1 tablet (4 mg) by mouth every 8 hours as needed, Disp-10 tablet, R-0, Local Print             Scribe Disclosure:  I, Aron Tesfaye, am serving as a scribe at 1:31 AM on 8/16/2019 to document services personally performed by Nabeel Mcgarry based on my observations and the provider's statements to me.     8/16/2019    EMERGENCY DEPARTMENT       Nabeel Mcgarry MD  08/16/19 1417

## 2019-08-16 NOTE — ED NOTES
"Pt brought back to a room from triage in a wheel chair.  Pt's chair placed next to the bed and locked.  I asked the pt to get into the bed.  The pt's son stated \"your not going to help her, her pain is at an 8.\"  I assisted the pt into the bed by helping her get up.  She laid comfortably in the bed and a fresh ice pack was placed on her foot.  The pt's son then asked me for my name, and the charge nurses name, as well as follow up from the charge RN.  The information was provided and the charge nurse (Ryan).  Ryan will follow up.  MD at bedside as I was exiting the room.   "

## 2019-08-16 NOTE — ED AVS SNAPSHOT
Emergency Department  [address] [facility] [address]  Phone:  [phone]  Fax:  [phone]                                    Anaya Toth   MRN: [id number]    Department:   Emergency Department   Date of Visit:  8/16/2019           After Visit Summary Signature Page    I have received my discharge instructions, and my questions have been answered. I have discussed any challenges I see with this plan with the nurse or doctor.    ..........................................................................................................................................  Patient/Patient Representative Signature      ..........................................................................................................................................  Patient Representative Print Name and Relationship to Patient    ..................................................               ................................................  Date                                   Time    ..........................................................................................................................................  Reviewed by Signature/Title    ...................................................              ..............................................  Date                                               Time          22EPIC Rev 08/18

## 2019-08-19 ENCOUNTER — OFFICE VISIT (OUTPATIENT)
Dept: PODIATRY | Facility: CLINIC | Age: 54
End: 2019-08-19
Payer: OTHER MISCELLANEOUS

## 2019-08-19 VITALS
WEIGHT: 126 LBS | SYSTOLIC BLOOD PRESSURE: 118 MMHG | BODY MASS INDEX: 23.19 KG/M2 | DIASTOLIC BLOOD PRESSURE: 64 MMHG | HEIGHT: 62 IN

## 2019-08-19 DIAGNOSIS — M79.675 GREAT TOE PAIN, LEFT: ICD-10-CM

## 2019-08-19 DIAGNOSIS — S92.425A CLOSED NONDISPLACED FRACTURE OF DISTAL PHALANX OF LEFT GREAT TOE, INITIAL ENCOUNTER: ICD-10-CM

## 2019-08-19 DIAGNOSIS — Z02.6 ENCOUNTER RELATED TO WORKER'S COMPENSATION CLAIM: Primary | ICD-10-CM

## 2019-08-19 PROCEDURE — 99243 OFF/OP CNSLTJ NEW/EST LOW 30: CPT | Performed by: PODIATRIST

## 2019-08-19 RX ORDER — CEPHALEXIN 500 MG/1
500 CAPSULE ORAL 2 TIMES DAILY
Qty: 14 CAPSULE | Refills: 0 | Status: SHIPPED | OUTPATIENT
Start: 2019-08-19 | End: 2019-08-28

## 2019-08-19 ASSESSMENT — MIFFLIN-ST. JEOR: SCORE: 1124.78

## 2019-08-19 NOTE — PROGRESS NOTES
PATIENT HISTORY:  Dr. Kraft requested I see this patient for their foot issue.  Anaya Toth is a 53 year old female who presents to clinic for left great toe fracture. 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 Was seen in urgent care and noted to have a fracture. Was put in post op shoe and told to follow up with me. Pain is 4/10. Denies fever, chils, nausa. Here with  and daughter.     Review of Systems:  Patient denies fever, chills, rash, wound, stiffness, numbness, weakness, heart burn, blood in stool, chest pain with activity, calf pain when walking, shortness of breath with activity, chronic cough, easy bleeding/bruising, swelling of ankles, excessive thirst, fatigue, depression, anxiety.  Patient admits to limping.     PAST MEDICAL HISTORY:   Past Medical History:   Diagnosis Date     CKD (chronic kidney disease) stage 1, GFR 90 ml/min or greater      HTN, goal below 140/90      Syncope         PAST SURGICAL HISTORY:   Past Surgical History:   Procedure Laterality Date     NO HISTORY OF SURGERY          MEDICATIONS:   Current Outpatient Medications:      cetirizine-psuedoePHEDrine (ZYRTEC-D) 5-120 MG per tablet, Take 1 tablet by mouth 2 times daily, Disp: 90 tablet, Rfl: 1     fluticasone (FLONASE) 50 MCG/ACT spray, Spray 1-2 sprays into both nostrils daily, Disp: 1 Bottle, Rfl: 11     HYDROcodone-acetaminophen (NORCO) 5-325 MG tablet, Take 1 tablet by mouth every 6 hours as needed, Disp: 8 tablet, Rfl: 0     lisinopril (PRINIVIL/ZESTRIL) 10 MG tablet, Take 1 tablet (10 mg) by mouth daily, Disp: 90 tablet, Rfl: 0     lisinopril (PRINIVIL/ZESTRIL) 10 MG tablet, Take 1 tablet (10 mg) by mouth daily, Disp: 90 tablet, Rfl: 3     ondansetron (ZOFRAN ODT) 4 MG ODT tab, Take 1 tablet (4 mg) by mouth every 8 hours as needed, Disp: 10 tablet, Rfl: 0     ALLERGIES:  No Known Allergies     SOCIAL HISTORY:   Social History     Socioeconomic History     Marital status:      Spouse  "name: Not on file     Number of children: Not on file     Years of education: Not on file     Highest education level: Not on file   Occupational History     Not on file   Social Needs     Financial resource strain: Not on file     Food insecurity:     Worry: Not on file     Inability: Not on file     Transportation needs:     Medical: Not on file     Non-medical: Not on file   Tobacco Use     Smoking status: Never Smoker     Smokeless tobacco: Never Used   Substance and Sexual Activity     Alcohol use: No     Drug use: No     Sexual activity: Yes     Partners: Male   Lifestyle     Physical activity:     Days per week: Not on file     Minutes per session: Not on file     Stress: Not on file   Relationships     Social connections:     Talks on phone: Not on file     Gets together: Not on file     Attends Yazdanism service: Not on file     Active member of club or organization: Not on file     Attends meetings of clubs or organizations: Not on file     Relationship status: Not on file     Intimate partner violence:     Fear of current or ex partner: Not on file     Emotionally abused: Not on file     Physically abused: Not on file     Forced sexual activity: Not on file   Other Topics Concern     Parent/sibling w/ CABG, MI or angioplasty before 65F 55M? Not Asked   Social History Narrative     Not on file        FAMILY HISTORY:   Family History   Problem Relation Age of Onset     Hypertension Mother      Hypertension Father      Hypertension Brother      Diabetes Sister 30        EXAM:Vitals: /64   Ht 1.575 m (5' 2\")   Wt 57.2 kg (126 lb)   BMI 23.05 kg/m    BMI= Body mass index is 23.05 kg/m .    General appearance: Patient is alert and fully cooperative with history & exam.  No sign of distress is noted during the visit.     Psychiatric: Affect is pleasant & appropriate.  Patient appears motivated to improve health.     Respiratory: Breathing is regular & unlabored while sitting.     HEENT: Hearing is " intact to spoken word.  Speech is clear.  No gross evidence of visual impairment that would impact ambulation.     Dermatologic: abrasion to left great toenail base. No streaking redness or purulent drainage noted.      Vascular: DP & PT pulses are intact & regular bilaterally.  No significant edema or varicosities noted.  CFT and skin temperature is normal to both lower extremities.     Neurologic: Lower extremity sensation is intact to light touch.  No evidence of weakness or contracture in the lower extremities.  No evidence of neuropathy.     Musculoskeletal: Patient is ambulatory without assistive device or brace.  No gross ankle deformity noted.  No foot or ankle joint effusion is noted.    Radiographs:  Left foot xrays - Minimally comminuted and displaced tuft fracture involving  the distal phalanx of the left great toe. Irregular transverse lucency  through the distal phalanx of the fifth toe which may be an incomplete  segmentation anomaly of the fifth toe phalanges or a fracture.  Recommend clinical correlation for pain and injury at this site. No  other fractures.     ASSESSMENT:    Encounter related to worker's compensation claim  Great toe pain, left  Closed nondisplaced fracture of distal phalanx of left great toe, initial encounter     PLAN:  Reviewed patient's chart in [facility]. Reviewed xrays.  Talked about fractures. Discussed that healing can take 6-10 weeks. Risk that the fracture will not heal and we may need to do surgery. Risk is increased 10-15% if you smoke.     Would recommend boot for next month. Recommend follow up in 1 month for repeat xrays. Recommend antibiotic given the open area on the toe. Will treat like an open fracture. Workability form was filled out to be out of work for next month. Was told to call with questions or concerns.        Anna Marc DPM, Podiatry/Foot and Ankle Surgery

## 2019-08-19 NOTE — PATIENT INSTRUCTIONS
Thank you for choosing [facility]!    DR. ALAS'S CLINIC SCHEDULE  MONDAY AM - [place] TUESDAY - [place]   [address] [address] [place] [address] [address] [place] [address]   [phone] / [phone] / [phone]       WEDNESDAY - [place] FRIDAY AM - [facility]   [address]   [place] [address] [place] [address]   [phone] / [phone]       FRIDAY PM - [place] SCHEDULE SURGERY: [phone]   [address] BILLING QUESTIONS: [phone]   [place] [address] AFTER HOURS: [phone]   [phone] / [phone] APPOINTMENTS: [phone]     Consumer Price Line (CPL) [phone]       One month follow up      TOE & METATARSAL FRACTURES  The structure of the foot is complex, consisting of bones, muscles, tendons, and other soft tissues. Of the 26 bones in the foot, 19 are toe bones (phalanges) and metatarsal bones (the long bones in the midfoot). Fractures of the toe and metatarsal bones are common and require evaluation by a specialist. A foot and ankle surgeon should be seen for proper diagnosis and treatment, even if initial treatment has been received in an emergency room.  A fracture is a break in the bone. Fractures can be divided into two categories: traumatic fractures and stress fractures.  TRAUMATIC FRACTURES (also called acute fractures) are caused by a direct blow or impact, such as seriously stubbing your toe. Traumatic fractures can be displaced or non-displaced. If the fracture is displaced, the bone is broken in such a way that it has changed in position (dislocated).  Signs and symptoms of a traumatic fracture include:  You may hear a sound at the time of the break.    Pinpoint pain  (pain at the place of impact) at the time the fracture occurs and perhaps for a few hours later, but often the pain goes away after several hours.   Crooked or abnormal  appearance of the toe.   Bruising and swelling the next day.   It is not true that  if you can walk on it, it s not broken.  Evaluation by a foot and ankle surgeon is always recommended.   STRESS FRACTURES are tiny, hairline breaks that are usually caused by repetitive stress. Stress fractures often afflict athletes who, for example, too rapidly increase their running mileage. They can also be caused by an abnormal foot structure, deformities, or osteoporosis. Improper footwear may also lead to stress fractures. Stress fractures should not be ignored. They require proper medical attention to heal correctly.  Symptoms of stress fractures include:  Pain with or after normal activity   Pain that goes away when resting and then returns when standing or during activity    Pinpoint pain  (pain at the site of the fracture) when touched   Swelling, but no bruising   IMPROPER TREATMENT  Some people say that  the doctor can t do anything for a broken bone in the foot.  This is usually not true. In fact, if a fractured toe or metatarsal bone is not treated correctly, serious complications may develop. For example:  A deformity in the bony architecture which may limit the ability to move the foot or cause difficulty in fitting shoes   Arthritis, which may be caused by a fracture in a joint (the juncture where two bones meet), or may be a result of angular deformities that develop when a displaced fracture is severe or hasn t been properly corrected   Chronic pain and deformity   Non-union, or failure to heal, can lead to subsequent surgery or chronic pain.   PROPER TREATMENT FOR TOES  Fractures of the toe bones are almost always traumatic fractures. Treatment for traumatic fractures depends on the break itself and may include these options:  Rest. Sometimes rest is all that is needed to treat a traumatic fracture of the toe.   Splinting. The toe may be fitted with a splint to keep it in a fixed position.   Rigid or stiff-soled  shoe. Wearing a stiff-soled shoe protects the toe and helps keep it properly positioned.    Zhang taping  the fractured toe to another toe is sometimes appropriate, but in other cases it may be harmful.   Surgery. If the break is badly displaced or if the joint is affected, surgery may be necessary. Surgery often involves the use of fixation devices, such as pins.   PROPER TREATMENT OF METATARSALS  Breaks in the metatarsal bones may be either stress or traumatic fractures. Certain kinds of fractures of the metatarsal bones present unique challenges.  For example, sometimes a fracture of the first metatarsal bone (behind the big toe) can lead to arthritis. Since the big toe is used so frequently and bears more weight than other toes, arthritis in that area can make it painful to walk, bend, or even stand.  Another type of break, called a Portillo fracture, occurs at the base of the fifth metatarsal bone (behind the little toe). It is often misdiagnosed as an ankle sprain, and misdiagnosis can have serious consequences since sprains and fractures require different treatments. Your foot and ankle surgeon is an expert in correctly identifying these conditions as well as other problems of the foot.  Treatment of metatarsal fractures depends on the type and extent of the fracture, and may include:  Rest. Sometimes rest is the only treatment needed to promote healing of a stress or traumatic fracture of a metatarsal bone.   Avoid the offending activity. Because stress fractures result from repetitive stress, it is important to avoid the activity that led to the fracture. Crutches or a wheelchair are sometimes required to offload weight from the foot to give it time to heal.   Immobilization, casting, or rigid shoe. A stiff-soled shoe or other form of immobilization may be used to protect the fractured bone while it is healing.   Surgery. Some traumatic fractures of the metatarsal bones require surgery, especially if the break  is badly displaced.   Follow-up care. Your foot and ankle surgeon will provide instructions for care following surgical or non-surgical treatment. Physical therapy, exercises and rehabilitation may be included in a schedule for return to normal activities.           BODY WEIGHT AND YOUR FEET  The following information is included in the after visit summary for all patients. Body weight can be a sensitive issue to discuss in clinic, but we think the following information is very important. Although we focus on the feet and ankles, we do support the overall health of our patients.     Many things can cause foot and ankle problems. Foot structure, activity level, foot mechanics and injuries are common causes of pain. One very important issue that often goes unmentioned, is body weight. Extra weight can cause increased stress on muscles, ligaments, bones and tendons. Sometimes just a few extra pounds is all it takes to put one over her/his threshold. Without reducing that stress, it can be difficult to alleviate pain. As Foot & Ankle specialists, our job is addressing the lower extremity problem and possible causes. Regarding extra body weight, we encourage patients to discuss diet and weight management plans with their primary care doctors. It is this team approach that gives you the best opportunity for pain relief and getting you back on your feet.      [facility] has a Comprehensive Weight Management Program. This program includes counseling, education, non-surgical and surgical approaches to weight loss. If you are interested in learning more either talk to you primary care provider or call [phone].

## 2019-08-19 NOTE — LETTER
8/19/2019         RE: Anaya Toth  [address]  [place] [address]        Dear Colleague,    Thank you for referring your patient, Anaya Toth, to the [facility]. Please see a copy of my visit note below.    PATIENT HISTORY:  Dr. Kraft requested I see this patient for their foot issue.  Anaya Toth is a 53 year old female who presents to clinic for left great toe fracture. 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 Was seen in urgent care and noted to have a fracture. Was put in post op shoe and told to follow up with me. Pain is 4/10. Denies fever, chils, nausa. Here with  and daughter.     Review of Systems:  Patient denies fever, chills, rash, wound, stiffness, numbness, weakness, heart burn, blood in stool, chest pain with activity, calf pain when walking, shortness of breath with activity, chronic cough, easy bleeding/bruising, swelling of ankles, excessive thirst, fatigue, depression, anxiety.  Patient admits to limping.     PAST MEDICAL HISTORY:   Past Medical History:   Diagnosis Date     CKD (chronic kidney disease) stage 1, GFR 90 ml/min or greater      HTN, goal below 140/90      Syncope         PAST SURGICAL HISTORY:   Past Surgical History:   Procedure Laterality Date     NO HISTORY OF SURGERY          MEDICATIONS:   Current Outpatient Medications:      cetirizine-psuedoePHEDrine (ZYRTEC-D) 5-120 MG per tablet, Take 1 tablet by mouth 2 times daily, Disp: 90 tablet, Rfl: 1     fluticasone (FLONASE) 50 MCG/ACT spray, Spray 1-2 sprays into both nostrils daily, Disp: 1 Bottle, Rfl: 11     HYDROcodone-acetaminophen (NORCO) 5-325 MG tablet, Take 1 tablet by mouth every 6 hours as needed, Disp: 8 tablet, Rfl: 0     lisinopril (PRINIVIL/ZESTRIL) 10 MG tablet, Take 1 tablet (10 mg) by mouth daily, Disp: 90 tablet, Rfl: 0     lisinopril (PRINIVIL/ZESTRIL) 10 MG tablet, Take 1 tablet (10 mg) by mouth daily, Disp: 90 tablet, Rfl: 3      "ondansetron (ZOFRAN ODT) 4 MG ODT tab, Take 1 tablet (4 mg) by mouth every 8 hours as needed, Disp: 10 tablet, Rfl: 0     ALLERGIES:  No Known Allergies     SOCIAL HISTORY:   Social History     Socioeconomic History     Marital status:      Spouse name: Not on file     Number of children: Not on file     Years of education: Not on file     Highest education level: Not on file   Occupational History     Not on file   Social Needs     Financial resource strain: Not on file     Food insecurity:     Worry: Not on file     Inability: Not on file     Transportation needs:     Medical: Not on file     Non-medical: Not on file   Tobacco Use     Smoking status: Never Smoker     Smokeless tobacco: Never Used   Substance and Sexual Activity     Alcohol use: No     Drug use: No     Sexual activity: Yes     Partners: Male   Lifestyle     Physical activity:     Days per week: Not on file     Minutes per session: Not on file     Stress: Not on file   Relationships     Social connections:     Talks on phone: Not on file     Gets together: Not on file     Attends [employer] service: Not on file     Active member of club or organization: Not on file     Attends meetings of clubs or organizations: Not on file     Relationship status: Not on file     Intimate partner violence:     Fear of current or ex partner: Not on file     Emotionally abused: Not on file     Physically abused: Not on file     Forced sexual activity: Not on file   Other Topics Concern     Parent/sibling w/ CABG, MI or angioplasty before 65F 55M? Not Asked   Social History Narrative     Not on file        FAMILY HISTORY:   Family History   Problem Relation Age of Onset     Hypertension Mother      Hypertension Father      Hypertension Brother      Diabetes Sister 30        EXAM:Vitals: /64   Ht 1.575 m (5' 2\")   Wt 57.2 kg (126 lb)   BMI 23.05 kg/m     BMI= Body mass index is 23.05 kg/m .    General appearance: Patient is alert and fully cooperative " with history & exam.  No sign of distress is noted during the visit.     Psychiatric: Affect is pleasant & appropriate.  Patient appears motivated to improve health.     Respiratory: Breathing is regular & unlabored while sitting.     HEENT: Hearing is intact to spoken word.  Speech is clear.  No gross evidence of visual impairment that would impact ambulation.     Dermatologic: abrasion to left great toenail base. No streaking redness or purulent drainage noted.      Vascular: DP & PT pulses are intact & regular bilaterally.  No significant edema or varicosities noted.  CFT and skin temperature is normal to both lower extremities.     Neurologic: Lower extremity sensation is intact to light touch.  No evidence of weakness or contracture in the lower extremities.  No evidence of neuropathy.     Musculoskeletal: Patient is ambulatory without assistive device or brace.  No gross ankle deformity noted.  No foot or ankle joint effusion is noted.    Radiographs:  Left foot xrays - Minimally comminuted and displaced tuft fracture involving  the distal phalanx of the left great toe. Irregular transverse lucency  through the distal phalanx of the fifth toe which may be an incomplete  segmentation anomaly of the fifth toe phalanges or a fracture.  Recommend clinical correlation for pain and injury at this site. No  other fractures.     ASSESSMENT:    Encounter related to worker's compensation claim  Great toe pain, left  Closed nondisplaced fracture of distal phalanx of left great toe, initial encounter     PLAN:  Reviewed patient's chart in [facility]. Reviewed xrays.  Talked about fractures. Discussed that healing can take 6-10 weeks. Risk that the fracture will not heal and we may need to do surgery. Risk is increased 10-15% if you smoke.     Would recommend boot for next month. Recommend follow up in 1 month for repeat xrays. Recommend antibiotic given the open area on the toe. Will treat like an open fracture. Workability  form was filled out to be out of work for next month. Was told to call with questions or concerns.        Anna Marc DPM, Podiatry/Foot and Ankle Surgery          Again, thank you for allowing me to participate in the care of your patient.        Sincerely,        Anna Marc DPM, Podiatry/Foot and Ankle Surgery

## 2019-08-28 ENCOUNTER — OFFICE VISIT (OUTPATIENT)
Dept: INTERNAL MEDICINE | Facility: CLINIC | Age: 54
End: 2019-08-28
Payer: COMMERCIAL

## 2019-08-28 VITALS
BODY MASS INDEX: 21.53 KG/M2 | WEIGHT: 117 LBS | HEART RATE: 74 BPM | HEIGHT: 62 IN | SYSTOLIC BLOOD PRESSURE: 112 MMHG | OXYGEN SATURATION: 100 % | DIASTOLIC BLOOD PRESSURE: 76 MMHG | RESPIRATION RATE: 16 BRPM | TEMPERATURE: 98 F

## 2019-08-28 DIAGNOSIS — N18.1 CKD (CHRONIC KIDNEY DISEASE) STAGE 1, GFR 90 ML/MIN OR GREATER: ICD-10-CM

## 2019-08-28 DIAGNOSIS — L65.9 ALOPECIA: ICD-10-CM

## 2019-08-28 DIAGNOSIS — Z00.00 HEALTHCARE MAINTENANCE: Primary | ICD-10-CM

## 2019-08-28 DIAGNOSIS — I10 HTN, GOAL BELOW 140/90: ICD-10-CM

## 2019-08-28 LAB
ALT SERPL W P-5'-P-CCNC: 23 U/L (ref 0–50)
ANION GAP SERPL CALCULATED.3IONS-SCNC: 8 MMOL/L (ref 3–14)
BASOPHILS # BLD AUTO: 0 10E9/L (ref 0–0.2)
BASOPHILS NFR BLD AUTO: 0.5 %
BUN SERPL-MCNC: 16 MG/DL (ref 7–30)
CALCIUM SERPL-MCNC: 9.3 MG/DL (ref 8.5–10.1)
CHLORIDE SERPL-SCNC: 109 MMOL/L (ref 94–109)
CHOLEST SERPL-MCNC: 180 MG/DL
CO2 SERPL-SCNC: 26 MMOL/L (ref 20–32)
CREAT SERPL-MCNC: 0.6 MG/DL (ref 0.52–1.04)
DIFFERENTIAL METHOD BLD: ABNORMAL
EOSINOPHIL # BLD AUTO: 0.1 10E9/L (ref 0–0.7)
EOSINOPHIL NFR BLD AUTO: 3.2 %
ERYTHROCYTE [DISTWIDTH] IN BLOOD BY AUTOMATED COUNT: 12.3 % (ref 10–15)
ERYTHROCYTE [DISTWIDTH] IN BLOOD BY AUTOMATED COUNT: NORMAL % (ref 10–15)
GFR SERPL CREATININE-BSD FRML MDRD: >90 ML/MIN/{1.73_M2}
GLUCOSE SERPL-MCNC: 103 MG/DL (ref 70–99)
HCT VFR BLD AUTO: 42.8 % (ref 35–47)
HCT VFR BLD AUTO: NORMAL % (ref 35–47)
HDLC SERPL-MCNC: 42 MG/DL
HGB BLD-MCNC: 13.7 G/DL (ref 11.7–15.7)
HGB BLD-MCNC: NORMAL G/DL (ref 11.7–15.7)
LDLC SERPL CALC-MCNC: 100 MG/DL
LYMPHOCYTES # BLD AUTO: 2.2 10E9/L (ref 0.8–5.3)
LYMPHOCYTES NFR BLD AUTO: 53.2 %
MCH RBC QN AUTO: 30.4 PG (ref 26.5–33)
MCH RBC QN AUTO: NORMAL PG (ref 26.5–33)
MCHC RBC AUTO-ENTMCNC: 32 G/DL (ref 31.5–36.5)
MCHC RBC AUTO-ENTMCNC: NORMAL G/DL (ref 31.5–36.5)
MCV RBC AUTO: 95 FL (ref 78–100)
MCV RBC AUTO: NORMAL FL (ref 78–100)
MONOCYTES # BLD AUTO: 0.4 10E9/L (ref 0–1.3)
MONOCYTES NFR BLD AUTO: 9.6 %
NEUTROPHILS # BLD AUTO: 1.4 10E9/L (ref 1.6–8.3)
NEUTROPHILS NFR BLD AUTO: 33.5 %
NONHDLC SERPL-MCNC: 138 MG/DL
PLATELET # BLD AUTO: 213 10E9/L (ref 150–450)
PLATELET # BLD AUTO: NORMAL 10E9/L (ref 150–450)
POTASSIUM SERPL-SCNC: 4.5 MMOL/L (ref 3.4–5.3)
RBC # BLD AUTO: 4.51 10E12/L (ref 3.8–5.2)
RBC # BLD AUTO: NORMAL 10E12/L (ref 3.8–5.2)
SODIUM SERPL-SCNC: 142 MMOL/L (ref 133–144)
TRIGL SERPL-MCNC: 188 MG/DL
TSH SERPL DL<=0.005 MIU/L-ACNC: 1.68 MU/L (ref 0.4–4)
WBC # BLD AUTO: 4.1 10E9/L (ref 4–11)
WBC # BLD AUTO: NORMAL 10E9/L (ref 4–11)

## 2019-08-28 PROCEDURE — 80061 LIPID PANEL: CPT | Performed by: NURSE PRACTITIONER

## 2019-08-28 PROCEDURE — 80048 BASIC METABOLIC PNL TOTAL CA: CPT | Performed by: NURSE PRACTITIONER

## 2019-08-28 PROCEDURE — 99213 OFFICE O/P EST LOW 20 MIN: CPT | Mod: 25 | Performed by: NURSE PRACTITIONER

## 2019-08-28 PROCEDURE — 99396 PREV VISIT EST AGE 40-64: CPT | Performed by: NURSE PRACTITIONER

## 2019-08-28 PROCEDURE — 84443 ASSAY THYROID STIM HORMONE: CPT | Performed by: NURSE PRACTITIONER

## 2019-08-28 PROCEDURE — 36415 COLL VENOUS BLD VENIPUNCTURE: CPT | Performed by: NURSE PRACTITIONER

## 2019-08-28 PROCEDURE — 84460 ALANINE AMINO (ALT) (SGPT): CPT | Performed by: NURSE PRACTITIONER

## 2019-08-28 PROCEDURE — 85025 COMPLETE CBC W/AUTO DIFF WBC: CPT | Performed by: NURSE PRACTITIONER

## 2019-08-28 RX ORDER — LISINOPRIL 10 MG/1
10 TABLET ORAL DAILY
Qty: 90 TABLET | Refills: 0 | Status: SHIPPED | OUTPATIENT
Start: 2019-08-28 | End: 2020-03-22

## 2019-08-28 ASSESSMENT — ENCOUNTER SYMPTOMS
CHILLS: 0
HEMATURIA: 0
NAUSEA: 0
ARTHRALGIAS: 0
COUGH: 0
WEAKNESS: 0
DIZZINESS: 0
HEMATOCHEZIA: 0
DYSURIA: 0
HEARTBURN: 0
DIARRHEA: 0
FREQUENCY: 0
PARESTHESIAS: 0
HEADACHES: 0
PALPITATIONS: 0
FEVER: 0
SHORTNESS OF BREATH: 0
JOINT SWELLING: 0
MYALGIAS: 0
ABDOMINAL PAIN: 0
SORE THROAT: 0
EYE PAIN: 0
CONSTIPATION: 0
NERVOUS/ANXIOUS: 0
BREAST MASS: 0

## 2019-08-28 ASSESSMENT — MIFFLIN-ST. JEOR: SCORE: 1083.96

## 2019-08-28 NOTE — PROGRESS NOTES
SUBJECTIVE:   CC: Anaya Toth is an 53 year old woman who presents for preventive health visit.     Healthy Habits:     Getting at least 3 servings of Calcium per day:  Yes    Bi-annual eye exam:  Yes    Dental care twice a year:  NO    Sleep apnea or symptoms of sleep apnea:  None    Diet:  Regular (no restrictions)    Frequency of exercise:  None    Taking medications regularly:  Yes    Medication side effects:  None    PHQ-2 Total Score: 0    Additional concerns today:  Yes            Today's PHQ-2 Score:   PHQ-2 ( 1999 [employer]) 8/28/2019   Q1: Little interest or pleasure in doing things 0   Q2: Feeling down, depressed or hopeless 0   PHQ-2 Score 0   Q1: Little interest or pleasure in doing things Not at all   Q2: Feeling down, depressed or hopeless Not at all   PHQ-2 Score 0       Abuse: Current or Past(Physical, Sexual or Emotional)- No  Do you feel safe in your environment? Yes    Social History     Tobacco Use     Smoking status: Never Smoker     Smokeless tobacco: Never Used   Substance Use Topics     Alcohol use: No     If you drink alcohol do you typically have >3 drinks per day or >7 drinks per week? No    Alcohol Use 8/28/2019   Prescreen: >3 drinks/day or >7 drinks/week? No       Reviewed orders with patient.  Reviewed health maintenance and updated orders accordingly - Yes  BP Readings from Last 3 Encounters:   08/28/19 112/76   08/19/19 118/64   08/16/19 (!) 159/75    Wt Readings from Last 3 Encounters:   08/28/19 53.1 kg (117 lb)   08/19/19 57.2 kg (126 lb)   08/16/19 57.2 kg (126 lb)                  Patient Active Problem List   Diagnosis     Abdominal pain, generalized     Syncope     HTN, goal below 140/90     CKD (chronic kidney disease) stage 1, GFR 90 ml/min or greater     Past Surgical History:   Procedure Laterality Date     NO HISTORY OF SURGERY         Social History     Tobacco Use     Smoking status: Never Smoker     Smokeless tobacco: Never Used   Substance Use Topics      Alcohol use: No     Family History   Problem Relation Age of Onset     Hypertension Mother      Hypertension Father      Hypertension Brother      Diabetes Sister 30         Current Outpatient Medications   Medication Sig Dispense Refill     cetirizine-psuedoePHEDrine (ZYRTEC-D) 5-120 MG per tablet Take 1 tablet by mouth 2 times daily 90 tablet 1     fluticasone (FLONASE) 50 MCG/ACT spray Spray 1-2 sprays into both nostrils daily 1 Bottle 11     lisinopril (PRINIVIL/ZESTRIL) 10 MG tablet Take 1 tablet (10 mg) by mouth daily 90 tablet 0       Mammogram Screening: Patient over age 50, mutual decision to screen reflected in health maintenance.    Pertinent mammograms are reviewed under the imaging tab.  History of abnormal Pap smear: NO - age 30-65 PAP every 5 years with negative HPV co-testing recommended  PAP / HPV Latest Ref Rng & Units 7/30/2018   PAP - NIL   HPV 16 DNA NEG:Negative Negative   HPV 18 DNA NEG:Negative Negative   OTHER HR HPV NEG:Negative Negative     Reviewed and updated as needed this visit by clinical staff  Tobacco  Allergies  Meds  Med Hx  Surg Hx  Fam Hx  Soc Hx        Reviewed and updated as needed this visit by Provider            Review of Systems   Constitutional: Negative for chills and fever.   HENT: Negative for congestion, ear pain, hearing loss and sore throat.    Eyes: Negative for pain and visual disturbance.   Respiratory: Negative for cough and shortness of breath.    Cardiovascular: Negative for chest pain, palpitations and peripheral edema.   Gastrointestinal: Negative for abdominal pain, constipation, diarrhea, heartburn, hematochezia and nausea.   Breasts:  Negative for tenderness, breast mass and discharge.   Genitourinary: Negative for dysuria, frequency, genital sores, hematuria, pelvic pain, urgency, vaginal bleeding and vaginal discharge.   Musculoskeletal: Negative for arthralgias, joint swelling and myalgias.   Skin: Negative for rash.   Neurological: Negative for  "dizziness, weakness, headaches and paresthesias.   Psychiatric/Behavioral: Negative for mood changes. The patient is not nervous/anxious.      C/o hair loss     OBJECTIVE:   /76 (BP Location: Right arm, Patient Position: Sitting, Cuff Size: Adult Regular)   Pulse 74   Temp 98  F (36.7  C) (Oral)   Resp 16   Ht 1.575 m (5' 2\")   Wt 53.1 kg (117 lb)   LMP 03/28/2018 (LMP Unknown)   SpO2 100%   BMI 21.40 kg/m    Physical Exam  GENERAL: healthy, alert and no distress  NECK: no adenopathy, no asymmetry, masses, or scars and thyroid normal to palpation  RESP: lungs clear to auscultation - no rales, rhonchi or wheezes  CV: regular rate and rhythm, normal S1 S2, no S3 or S4, no murmur, click or rub, no peripheral edema and peripheral pulses strong  ABDOMEN: soft, nontender, no hepatosplenomegaly, no masses and bowel sounds normal  SKIN: alopecia patches scalp  PSYCH: mentation appears normal, affect normal/bright        ASSESSMENT/PLAN:       ICD-10-CM    1. Healthcare maintenance Z00.00 DX Hip/Pelvis/Spine     CBC with platelets     ALT     Lipid Profile     TSH with free T4 reflex   2. CKD (chronic kidney disease) stage 1, GFR 90 ml/min or greater N18.1 lisinopril (PRINIVIL/ZESTRIL) 10 MG tablet   3. HTN, goal below 140/90 I10 lisinopril (PRINIVIL/ZESTRIL) 10 MG tablet     Basic metabolic panel     Labs pending  Consider derm referral for alopecia      COUNSELING:  Reviewed preventive health counseling, as reflected in patient instructions    Estimated body mass index is 21.4 kg/m  as calculated from the following:    Height as of this encounter: 1.575 m (5' 2\").    Weight as of this encounter: 53.1 kg (117 lb).         reports that she has never smoked. She has never used smokeless tobacco.      Counseling Resources:  ATP IV Guidelines  Pooled Cohorts Equation Calculator  Breast Cancer Risk Calculator  FRAX Risk Assessment  ICSI Preventive Guidelines  Dietary Guidelines for Americans, 2010  USDA's " MyPlate  ASA Prophylaxis  Lung CA Screening    Olesya Richards, SAMPSON  [facility]

## 2019-08-28 NOTE — NURSING NOTE
"Physical,not fasting and no pap needed.  /76 (BP Location: Right arm, Patient Position: Sitting, Cuff Size: Adult Regular)   Pulse 74   Temp 98  F (36.7  C) (Oral)   Resp 16   Ht 1.575 m (5' 2\")   Wt 53.1 kg (117 lb)   LMP 03/28/2018 (LMP Unknown)   SpO2 100%   BMI 21.40 kg/m      "

## 2019-08-28 NOTE — LETTER
August 29, 2019      Anaya Toth  [address]  [place] [address]        Dear ,    We are writing to inform you of your test results.    Your glucose is elevated at 103 (normal <100) but not high enough to make you a diabetic. But it does indicate you are at high risk for developing diabetes mellitus. Exercise, avoiding simple carbohydrates in your diet and wt loss will all help to lower   this risk. Your cholesterol is mildly elevated.  Please work on these as you can and I recommend rechecking fasting blood sugar in 6 months.     Resulted Orders   CBC with platelets   Result Value Ref Range    WBC Canceled, Test credited 4.0 - 11.0 10e9/L    RBC Count Canceled, Test credited 3.8 - 5.2 10e12/L    Hemoglobin Canceled, Test credited 11.7 - 15.7 g/dL    Hematocrit Canceled, Test credited 35.0 - 47.0 %    MCV Canceled, Test credited 78 - 100 fl    MCH Canceled, Test credited 26.5 - 33.0 pg    MCHC Canceled, Test credited 31.5 - 36.5 g/dL    RDW Canceled, Test credited 10.0 - 15.0 %    Platelet Count Canceled, Test credited 150 - 450 10e9/L   Basic metabolic panel   Result Value Ref Range    Sodium 142 133 - 144 mmol/L    Potassium 4.5 3.4 - 5.3 mmol/L    Chloride 109 94 - 109 mmol/L    Carbon Dioxide 26 20 - 32 mmol/L    Anion Gap 8 3 - 14 mmol/L    Glucose 103 (H) 70 - 99 mg/dL      Comment:      Fasting specimen    Urea Nitrogen 16 7 - 30 mg/dL    Creatinine 0.60 0.52 - 1.04 mg/dL    GFR Estimate >90 >60 mL/min/[1.73_m2]      Comment:      Non  GFR Calc  Starting 12/18/2018, serum creatinine based estimated GFR (eGFR) will be   calculated using the Chronic Kidney Disease Epidemiology Collaboration   (CKD-EPI) equation.      GFR Estimate If Black >90 >60 mL/min/[1.73_m2]      Comment:       GFR Calc  Starting 12/18/2018, serum creatinine based estimated GFR (eGFR) will be   calculated using the Chronic Kidney Disease Epidemiology Collaboration   (CKD-EPI)  equation.      Calcium 9.3 8.5 - 10.1 mg/dL   ALT   Result Value Ref Range    ALT 23 0 - 50 U/L   Lipid Profile   Result Value Ref Range    Cholesterol 180 <200 mg/dL    Triglycerides 188 (H) <150 mg/dL      Comment:      Borderline high:  150-199 mg/dl  High:             200-499 mg/dl  Very high:       >499 mg/dl  Fasting specimen      HDL Cholesterol 42 (L) >49 mg/dL    LDL Cholesterol Calculated 100 (H) <100 mg/dL      Comment:      Above desirable:  100-129 mg/dl  Borderline High:  130-159 mg/dL  High:             160-189 mg/dL  Very high:       >189 mg/dl      Non HDL Cholesterol 138 (H) <130 mg/dL      Comment:      Above Desirable:  130-159 mg/dl  Borderline high:  160-189 mg/dl  High:             190-219 mg/dl  Very high:       >219 mg/dl     TSH with free T4 reflex   Result Value Ref Range    TSH 1.68 0.40 - 4.00 mU/L   CBC with platelets differential   Result Value Ref Range    WBC 4.1 4.0 - 11.0 10e9/L    RBC Count 4.51 3.8 - 5.2 10e12/L    Hemoglobin 13.7 11.7 - 15.7 g/dL    Hematocrit 42.8 35.0 - 47.0 %    MCV 95 78 - 100 fl    MCH 30.4 26.5 - 33.0 pg    MCHC 32.0 31.5 - 36.5 g/dL    RDW 12.3 10.0 - 15.0 %    Platelet Count 213 150 - 450 10e9/L    % Neutrophils 33.5 %    % Lymphocytes 53.2 %    % Monocytes 9.6 %    % Eosinophils 3.2 %    % Basophils 0.5 %    Absolute Neutrophil 1.4 (L) 1.6 - 8.3 10e9/L    Absolute Lymphocytes 2.2 0.8 - 5.3 10e9/L    Absolute Monocytes 0.4 0.0 - 1.3 10e9/L    Absolute Eosinophils 0.1 0.0 - 0.7 10e9/L    Absolute Basophils 0.0 0.0 - 0.2 10e9/L    Diff Method Automated Method        If you have any questions or concerns, please call the clinic at the number listed above.       Sincerely,        Olesya Richards NP

## 2019-09-01 ENCOUNTER — TRANSFERRED RECORDS (OUTPATIENT)
Dept: HEALTH INFORMATION MANAGEMENT | Facility: CLINIC | Age: 54
End: 2019-09-01

## 2019-09-07 ENCOUNTER — MYC MEDICAL ADVICE (OUTPATIENT)
Dept: INTERNAL MEDICINE | Facility: CLINIC | Age: 54
End: 2019-09-07

## 2019-09-07 DIAGNOSIS — L65.9 ALOPECIA: Primary | ICD-10-CM

## 2019-09-09 ENCOUNTER — TELEPHONE (OUTPATIENT)
Dept: PODIATRY | Facility: CLINIC | Age: 54
End: 2019-09-09

## 2019-09-09 ENCOUNTER — OFFICE VISIT (OUTPATIENT)
Dept: PODIATRY | Facility: CLINIC | Age: 54
End: 2019-09-09
Payer: OTHER MISCELLANEOUS

## 2019-09-09 ENCOUNTER — ANCILLARY PROCEDURE (OUTPATIENT)
Dept: GENERAL RADIOLOGY | Facility: CLINIC | Age: 54
End: 2019-09-09
Attending: PODIATRIST
Payer: COMMERCIAL

## 2019-09-09 VITALS
SYSTOLIC BLOOD PRESSURE: 116 MMHG | BODY MASS INDEX: 21.53 KG/M2 | HEIGHT: 62 IN | WEIGHT: 117 LBS | DIASTOLIC BLOOD PRESSURE: 60 MMHG

## 2019-09-09 DIAGNOSIS — M79.672 LEFT FOOT PAIN: Primary | ICD-10-CM

## 2019-09-09 DIAGNOSIS — M79.672 LEFT FOOT PAIN: ICD-10-CM

## 2019-09-09 DIAGNOSIS — Z02.6 ENCOUNTER RELATED TO WORKER'S COMPENSATION CLAIM: ICD-10-CM

## 2019-09-09 DIAGNOSIS — S92.425D NONDISPLACED FRACTURE OF DISTAL PHALANX OF LEFT GREAT TOE, SUBSEQUENT ENCOUNTER FOR FRACTURE WITH ROUTINE HEALING: ICD-10-CM

## 2019-09-09 PROCEDURE — 73630 X-RAY EXAM OF FOOT: CPT | Mod: LT

## 2019-09-09 PROCEDURE — 99213 OFFICE O/P EST LOW 20 MIN: CPT | Performed by: PODIATRIST

## 2019-09-09 ASSESSMENT — MIFFLIN-ST. JEOR: SCORE: 1083.96

## 2019-09-09 NOTE — TELEPHONE ENCOUNTER
Pt. Had appointment today for her toe and it was discussed that she would be out of work until 9/23.  Her daugther called and she wasn't sure if Dr. Marc was going to send that info to her work today or not.  Please call to confirm if disability forms for work were sent over.    Call back is [phone]  Can leave a detailed message

## 2019-09-09 NOTE — LETTER
"    9/9/2019         RE: Anaya Toth  [address]  [place] [address]        Dear Colleague,    Thank you for referring your patient, Anaya Toth, to the [facility]. Please see a copy of my visit note below.    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 She is noting pain to the side of her left foot and left hip today. Here with .  Notes the foot will still swell.     Objective:  Vitals: /60   Ht 1.575 m (5' 2\")   Wt 53.1 kg (117 lb)   LMP 03/28/2018 (LMP Unknown)   BMI 21.40 kg/m     BMI= Body mass index is 21.4 kg/m .    General:  Patient is alert and orientated.  NAD    Dermatologic: abrasion to left great toenail bas is healed. No streaking redness or purulent drainage noted.      Vascular: DP & PT pulses are intact & regular bilaterally.  No significant edema or varicosities noted.  CFT and skin temperature is normal to both lower extremities.     Neurologic: Lower extremity sensation is intact to light touch.  No evidence of weakness or contracture in the lower extremities.  No evidence of neuropathy.     Musculoskeletal: Patient is ambulatory without assistive device or brace.  No gross ankle deformity noted.  No foot or ankle joint effusion is noted.     Radiographs:  Left foot xrays - Minimally comminuted and displaced tuft fracture involving  the distal phalanx of the left great toe with bony consolidation noted across fracture site to note healing .     ASSESSMENT:    Encounter related to worker's compensation claim  Great toe pain, left  Closed nondisplaced fracture of distal phalanx of left great toe, initial encounter     PLAN:  Reviewed patient's chart in [facility]. Reviewed xrays.   There is bone healing noted on xray.       Discussed that I wrote disability forms to be out of work for the left great toe " fracture for 6 weeks (until 9/22/2019). Discussed based on xrays and healing, that I can not extend that time off and would recommend going back to work 9/23/2019.  Discussed that the swelling is normal and can come and go off and on for a year after injury. Recommend compression socks. Discussed that she can start to get back into a regular shoe at this time.     As for her hip pain, that is new from her last visit and out of my scope of practice. Recommend follow up with primary or ortho doctor.       Anna Marc DPM, Podiatry/Foot and Ankle Surgery          Again, thank you for allowing me to participate in the care of your patient.        Sincerely,        Anna Marc DPM, Podiatry/Foot and Ankle Surgery

## 2019-09-09 NOTE — TELEPHONE ENCOUNTER
It looks like you intended to refer patient to derm for the alopecia but not referral generated.  RACHNA Graham R.N.

## 2019-09-09 NOTE — TELEPHONE ENCOUNTER
The workability forms were faxed on 8/23/2019 (see media section of the chart) for patient to be out till 9/22/2019.     As far as I know, I would only need to refill out paperwork if I was changing the return to work date which I am not.  the xrays show healing and it was explained that I had her out from work for the toe, which is healing normally based on xrays, and discussed that she can start to transition to regular tennis shoes.        Patient had mentioned hip pain and being out of work longer but I do not treat hip pain and she would need to see her primary care doctor for it or orthopedics.    Please let patient's daughter know.    Anna Marc DPM

## 2019-09-09 NOTE — PATIENT INSTRUCTIONS
Thank you for choosing [facility]!    DR. ALAS'S CLINIC SCHEDULE  MONDAY AM - [place] TUESDAY - [place]   [address] [address] [place] [address] [place] [address]   [phone] / [phone] / [phone]       WEDNESDAY - [place] FRIDAY AM - [facility]   [address]   [place] [address] [place] [address]   [phone] / [phone]       FRIDAY PM - [place] SCHEDULE SURGERY: [phone]   [address] [facility] BILLING QUESTIONS: [phone]   [place] [address] AFTER HOURS: [phone]   [phone] / [phone] APPOINTMENTS: [phone]     Consumer Price Line (CPL) [phone]       Follow up as needed        BODY WEIGHT AND YOUR FEET  The following information is included in the after visit summary for all patients. Body weight can be a sensitive issue to discuss in clinic, but we think the following information is very important. Although we focus on the feet and ankles, we do support the overall health of our patients.     Many things can cause foot and ankle problems. Foot structure, activity level, foot mechanics and injuries are common causes of pain. One very important issue that often goes unmentioned, is body weight. Extra weight can cause increased stress on muscles, ligaments, bones and tendons. Sometimes just a few extra pounds is all it takes to put one over her/his threshold. Without reducing that stress, it can be difficult to alleviate pain. As Foot & Ankle specialists, our job is addressing the lower extremity problem and possible causes. Regarding extra body weight, we encourage patients to discuss diet and weight management plans with their primary care doctors. It is this team approach that gives you the best opportunity for pain relief and getting you back on your feet.      [facility] has a Comprehensive Weight Management Program. This program  includes counseling, education, non-surgical and surgical approaches to weight loss. If you are interested in learning more either talk to you primary care provider or call [phone].

## 2019-09-09 NOTE — PROGRESS NOTES
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 She is noting pain to the side of her left foot and left hip today. Here with .  Notes the foot will still swell.     Objective:  Vitals: /60   Ht 1.575 m (5' 2\")   Wt 53.1 kg (117 lb)   LMP 03/28/2018 (LMP Unknown)   BMI 21.40 kg/m    BMI= Body mass index is 21.4 kg/m .    General:  Patient is alert and orientated.  NAD    Dermatologic: abrasion to left great toenail bas is healed. No streaking redness or purulent drainage noted.      Vascular: DP & PT pulses are intact & regular bilaterally.  No significant edema or varicosities noted.  CFT and skin temperature is normal to both lower extremities.     Neurologic: Lower extremity sensation is intact to light touch.  No evidence of weakness or contracture in the lower extremities.  No evidence of neuropathy.     Musculoskeletal: Patient is ambulatory without assistive device or brace.  No gross ankle deformity noted.  No foot or ankle joint effusion is noted.     Radiographs:  Left foot xrays - Minimally comminuted and displaced tuft fracture involving  the distal phalanx of the left great toe with bony consolidation noted across fracture site to note healing .     ASSESSMENT:    Encounter related to worker's compensation claim  Great toe pain, left  Closed nondisplaced fracture of distal phalanx of left great toe, initial encounter     PLAN:  Reviewed patient's chart in [facility]. Reviewed xrays.  There is bone healing noted on xray.       Discussed that I wrote disability forms to be out of work for the left great toe fracture for 6 weeks (until 9/22/2019). Discussed based on xrays and healing, that I can not extend that time off and would recommend going back to work 9/23/2019.  Discussed that the swelling is normal and can come and go off and on for a year after " injury. Recommend compression socks. Discussed that she can start to get back into a regular shoe at this time.     As for her hip pain, that is new from her last visit and out of my scope of practice. Recommend follow up with primary or ortho doctor.       Anna Marc DPM, Podiatry/Foot and Ankle Surgery

## 2019-09-10 NOTE — TELEPHONE ENCOUNTER
Daughter, Thu, returns call from message left on her voicemail from . Informed we do not have a consent to speak with her. She was able to put mother on the phone and obtained verbal permission to speak with Thu. Informed of previous call and paperwork that was faxed. She asks if a copy can be mailed to patient's home address. Informed copy will be mailed.   Also recommended patient complete a consent to communicate for future calls and copy mailed to her as well.     Informed that appointment for 9/16/19 is not needed and was cancelled.     She also asks who patient is to see for her hip. She states they scheduled with her PCP last time and they went for the appointment and were told she couldn't see her and to see Dr. Marc. They were not aware that Dr. Marc wasn't an orthopedic physician.   Recommended that they call PCP and see if patient can see someone at their clinic or obtain referral for hip pain for orthopedics. She was appreciative of assistance and verbalized understanding.     RADHA Tobias RN

## 2019-09-10 NOTE — TELEPHONE ENCOUNTER
Left message via [place]  that patient does not need to keep 09-16-19 appointment.     Jasbir Biggs MA on 9/10/2019 at 3:17 PM

## 2019-09-10 NOTE — TELEPHONE ENCOUNTER
Spoke with patient via [place] . Informed them of Dr. Marc's recommendations below.     Patient questions if she needs to keep appointment on 09-16-19. Per office visit note of 09-09-19 patient is to follow up prn. Will discuss with provider and get back to her.    Please advise.     Jasbir Biggs MA on 9/10/2019 at 2:50 PM

## 2019-09-12 LAB
HPV ABSTRACT: NORMAL
PAP-ABSTRACT: NORMAL

## 2019-09-17 ENCOUNTER — OFFICE VISIT (OUTPATIENT)
Dept: PODIATRY | Facility: CLINIC | Age: 54
End: 2019-09-17
Payer: OTHER MISCELLANEOUS

## 2019-09-17 VITALS
HEIGHT: 62 IN | WEIGHT: 117 LBS | BODY MASS INDEX: 21.53 KG/M2 | DIASTOLIC BLOOD PRESSURE: 60 MMHG | SYSTOLIC BLOOD PRESSURE: 116 MMHG

## 2019-09-17 DIAGNOSIS — Z02.6 ENCOUNTER RELATED TO WORKER'S COMPENSATION CLAIM: ICD-10-CM

## 2019-09-17 DIAGNOSIS — M79.672 LEFT FOOT PAIN: Primary | ICD-10-CM

## 2019-09-17 DIAGNOSIS — S92.425G: ICD-10-CM

## 2019-09-17 DIAGNOSIS — R60.0 EDEMA OF LEFT FOOT: ICD-10-CM

## 2019-09-17 PROCEDURE — 99213 OFFICE O/P EST LOW 20 MIN: CPT | Performed by: PODIATRIST

## 2019-09-17 ASSESSMENT — MIFFLIN-ST. JEOR: SCORE: 1083.96

## 2019-09-17 NOTE — LETTER
REPORT OF WORK ABILITY    [facility]  [address] [facility] [address]  [phone]    Employee Name: Anaya Toth        : 1965       Today's date: 2019    To Whom it May Concern:    Patient was seen in clinic. At this time, recommend seated work only for 6 hours a day 5 days a week for next month.             Anna Marc DPM

## 2019-09-17 NOTE — PROGRESS NOTES
"Podiatry / Foot and Ankle Surgery Progress Note    September 17, 2019    Subject: Patient was seen for follow up on continued pain to left foot after work injury.  Patient is one month out from injury. Was in a post op shoe until last week when she was told she could go into tennis shoes. Notes she gets aching and intermittent sharp pain to big toe and little toe that will shoot up to hips. Here with  and QRC and daughter.     Objective:  Vitals: /60   Ht 1.575 m (5' 2\")   Wt 53.1 kg (117 lb)   LMP 03/28/2018 (LMP Unknown)   BMI 21.40 kg/m    BMI= Body mass index is 21.4 kg/m .    General:  Patient is alert and orientated.  NAD    Dermatologic: abrasion to left great toenail base is healed. No streaking redness or purulent drainage noted. dried subungual hematoma under left great toenail.      Vascular: DP & PT pulses are intact & regular bilaterally.  minimal edema but no varicosities noted to left foot. .  CFT and skin temperature is normal to both lower extremities.     Neurologic: Lower extremity sensation is intact to light touch.  No evidence of weakness or contracture in the lower extremities.  No evidence of neuropathy.     Musculoskeletal: Patient is ambulatory without assistive device or brace.  No gross ankle deformity noted.  No foot or ankle joint effusion is noted.     Radiographs:  Left foot xrays - Minimally comminuted and displaced tuft fracture involving the distal phalanx of the left great toe with bony consolidation noted across fracture site indicating healing .     ASSESSMENT:     Left foot pain  Nondisplaced fracture of distal phalanx of left great toe, subsequent encounter for fracture with delayed healing  Encounter related to worker's compensation claim  Edema of left foot     PLAN:  Reviewed patient's chart in [facility]. Again reviewed xrays. She can continue wearing tennis shoes. Discussed swelling is common on and off for up to a year after surgery. Was given order for " compressions socks. was given referral for occupational medicine. Was also given letter to do seated work until assessed by occupational medicine. She will follow up with me as needed.       Anna Marc DPM, Podiatry/Foot and Ankle Surgery

## 2019-09-17 NOTE — PATIENT INSTRUCTIONS
Thank you for choosing [facility]!    DR. ALAS'S CLINIC SCHEDULE  MONDAY AM - [place] TUESDAY - [place]   [address] [address] [place] [address] [address] [place] [address]   [phone] / [phone] / [phone]       WEDNESDAY - [place] FRIDAY AM - [facility]   [address]   [place] [address] [place] [address]   [phone] / [phone]       FRIDAY PM - [place] SCHEDULE SURGERY: [phone]   [address] [facility] [address] BILLING QUESTIONS: [phone]   [place] [address] AFTER HOURS: [phone]   [phone] / [phone] APPOINTMENTS: [phone]     [employer] (CPL) [phone]     Follow up as needed          BODY WEIGHT AND YOUR FEET  The following information is included in the after visit summary for all patients. Body weight can be a sensitive issue to discuss in clinic, but we think the following information is very important. Although we focus on the feet and ankles, we do support the overall health of our patients.     Many things can cause foot and ankle problems. Foot structure, activity level, foot mechanics and injuries are common causes of pain. One very important issue that often goes unmentioned, is body weight. Extra weight can cause increased stress on muscles, ligaments, bones and tendons. Sometimes just a few extra pounds is all it takes to put one over her/his threshold. Without reducing that stress, it can be difficult to alleviate pain. As Foot & Ankle specialists, our job is addressing the lower extremity problem and possible causes. Regarding extra body weight, we encourage patients to discuss diet and weight management plans with their primary care doctors. It is this team approach that gives you the best opportunity for pain relief and getting you back on your feet.      [facility] has a Comprehensive Weight Management Program. This program  includes counseling, education, non-surgical and surgical approaches to weight loss. If you are interested in learning more either talk to you primary care provider or call [phone].

## 2019-09-17 NOTE — LETTER
"    9/17/2019         RE: Anaya Toth  [address]  [place] [address]        Dear Colleague,    Thank you for referring your patient, Anaya Toth, to the [facility]. Please see a copy of my visit note below.    Podiatry / Foot and Ankle Surgery Progress Note    September 17, 2019    Subject: Patient was seen for follow up on continued pain to left foot after work injury.  Patient is one month out from injury. Was in a post op shoe until last week when she was told she could go into tennis shoes. Notes she gets aching and intermittent sharp pain to big toe and little toe that will shoot up to hips. Here with  and QRC and daughter.     Objective:  Vitals: /60   Ht 1.575 m (5' 2\")   Wt 53.1 kg (117 lb)   LMP 03/28/2018 (LMP Unknown)   BMI 21.40 kg/m     BMI= Body mass index is 21.4 kg/m .    General:  Patient is alert and orientated.  NAD    Dermatologic: abrasion to left great toenail base is healed. No streaking redness or purulent drainage noted. dried subungual hematoma under left great toenail.      Vascular: DP & PT pulses are intact & regular bilaterally.  minimal edema but no varicosities noted to left foot. .  CFT and skin temperature is normal to both lower extremities.     Neurologic: Lower extremity sensation is intact to light touch.  No evidence of weakness or contracture in the lower extremities.  No evidence of neuropathy.     Musculoskeletal: Patient is ambulatory without assistive device or brace.  No gross ankle deformity noted.  No foot or ankle joint effusion is noted.     Radiographs:  Left foot xrays - Minimally comminuted and displaced tuft fracture involving the distal phalanx of the left great toe with bony consolidation noted across fracture site indicating healing .     ASSESSMENT:     Left foot pain  Nondisplaced fracture of distal phalanx of left great toe, subsequent encounter for fracture with delayed healing  Encounter " related to worker's compensation claim  Edema of left foot     PLAN:  Reviewed patient's chart in [facility]. Again reviewed xrays. She can continue wearing tennis shoes. Discussed swelling is common on and off for up to a year after surgery. Was given order for compressions socks. was given referral for occupational medicine. Was also given letter to do seated work until assessed by occupational medicine. She will follow up with me as needed.       Anna Marc DPM, Podiatry/Foot and Ankle Surgery          Again, thank you for allowing me to participate in the care of your patient.        Sincerely,        Anna Marc DPM, Podiatry/Foot and Ankle Surgery

## 2020-01-14 ENCOUNTER — OFFICE VISIT (OUTPATIENT)
Dept: DERMATOLOGY | Facility: CLINIC | Age: 55
End: 2020-01-14
Payer: COMMERCIAL

## 2020-01-14 VITALS — DIASTOLIC BLOOD PRESSURE: 76 MMHG | OXYGEN SATURATION: 100 % | HEART RATE: 86 BPM | SYSTOLIC BLOOD PRESSURE: 138 MMHG

## 2020-01-14 DIAGNOSIS — L63.9 AA (ALOPECIA AREATA): Primary | ICD-10-CM

## 2020-01-14 PROCEDURE — 11901 INJECT SKIN LESIONS >7: CPT | Performed by: PHYSICIAN ASSISTANT

## 2020-01-14 PROCEDURE — 99213 OFFICE O/P EST LOW 20 MIN: CPT | Mod: 25 | Performed by: PHYSICIAN ASSISTANT

## 2020-01-14 RX ORDER — FLUOCINONIDE TOPICAL SOLUTION USP, 0.05% 0.5 MG/ML
SOLUTION TOPICAL
Qty: 50 ML | Refills: 3 | Status: SHIPPED | OUTPATIENT
Start: 2020-01-14 | End: 2021-10-25

## 2020-01-14 NOTE — PROGRESS NOTES
HPI:   Chief complaints: Anaya Toth is a 54 year old female who presents for evaluation of hair loss. Sister cut hair and noticed bald patches on scalp.   Condition present for:  5-6 months.   Previous treatments include: discontinued shampoo and using orange and lemon peel hair product    Review Of Systems  Eyes: negative  Ears/Nose/Throat: negative  Respiratory: No shortness of breath, dyspnea on exertion, cough, or hemoptysis  Cardiovascular: negative  Gastrointestinal: negative  Genitourinary: negative  Musculoskeletal: negative  Neurologic: negative  Psychiatric: negative  Skin: positive for hair loss    This document serves as a record of the services and decisions personally performed and made by Kat Bradley, MS, PA-C. It was created on her behalf by Lucie Shukla, a trained medical scribe. The creation of this document is based on the provider's statements to the medical scribe.  Lucie Shukla 9:36 AM January 14, 2020    PHYSICAL EXAM:    /76   Pulse 86   LMP 03/28/2018 (LMP Unknown)   SpO2 100%   Breastfeeding No   Skin exam performed as follows: Type 3 skin. Mood appropriate  Alert and Oriented X 3. Well developed, well nourished in no distress.  General appearance: Normal  Head including face: Normal  Eyes: conjunctiva and lids: Normal  Mouth: Lips, teeth, gums: Normal  Neck: Normal  Chest-breast/axillae: Normal  Back: Normal  Spleen and liver: Normal  Cardiovascular: Exam of peripheral vascular system by observation for swelling, varicosities, edema: Normal  Genitalia: groin, buttocks: Normal  Extremities: digits/nails (clubbing): Normal  Eccrine and Apocrine glands: Normal  Right upper extremity: Normal  Left upper extremity: Normal  Right lower extremity: Normal  Left lower extremity: Normal  Skin: Scalp and body hair: See below    bald patches with minimal hair located on the scalp    ASSESSMENT/PLAN:     Alopecia Areata: advised on chronic, recurrent  nature of condition and diffficulty in treating. Advised on risk of failure to respond to treatment. Discussed topicals vs ILK injections. She would like to proceed with ILK injections in addition to topicals.   --Start fluocinonide solution daily  --Kenalog 5 mg/cc injected to scalp >7 areas treated. Total of 2 cc's used.  Advised on risk of atrophy and failure to respond. Advised on aftercare.            Follow-up: 4-6 weeks for ILK injections  CC:   Scribed By: Lucie Shukla, Medical Scribe    The information in this document, created by the medical scribe for me, accurately reflects the services I personally performed and the decisions made by me. I have reviewed and approved this document for accuracy prior to leaving the patient care area.  January 14, 2020 9:46 AM    Kat Bradley MS, PA-C

## 2020-01-14 NOTE — LETTER
1/14/2020         RE: Anaya Toth  [address]  [place] [address]        Dear Colleague,    Thank you for referring your patient, Anaya Toth, to the [facility]. Please see a copy of my visit note below.    HPI:   Chief complaints: Anaya Toth is a 54 year old female who presents for evaluation of hair loss. Sister cut hair and noticed bald patches on scalp.   Condition present for:  5-6 months.   Previous treatments include: discontinued shampoo and using orange and lemon peel hair product    Review Of Systems  Eyes: negative  Ears/Nose/Throat: negative  Respiratory: No shortness of breath, dyspnea on exertion, cough, or hemoptysis  Cardiovascular: negative  Gastrointestinal: negative  Genitourinary: negative  Musculoskeletal: negative  Neurologic: negative  Psychiatric: negative  Skin: positive for hair loss    This document serves as a record of the services and decisions personally performed and made by Kat Bradley, MS, PA-C. It was created on her behalf by Lucie Shukla, a trained medical scribe. The creation of this document is based on the provider's statements to the medical scribe.  Lucie Shukla 9:36 AM January 14, 2020    PHYSICAL EXAM:    /76   Pulse 86   LMP 03/28/2018 (LMP Unknown)   SpO2 100%   Breastfeeding No   Skin exam performed as follows: Type 3 skin. Mood appropriate  Alert and Oriented X 3. Well developed, well nourished in no distress.  General appearance: Normal  Head including face: Normal  Eyes: conjunctiva and lids: Normal  Mouth: Lips, teeth, gums: Normal  Neck: Normal  Chest-breast/axillae: Normal  Back: Normal  Spleen and liver: Normal  Cardiovascular: Exam of peripheral vascular system by observation for swelling, varicosities, edema: Normal  Genitalia: groin, buttocks: Normal  Extremities: digits/nails (clubbing): Normal  Eccrine and Apocrine glands: Normal  Right upper extremity:  Normal  Left upper extremity: Normal  Right lower extremity: Normal  Left lower extremity: Normal  Skin: Scalp and body hair: See below    bald patches with minimal hair located on the scalp    ASSESSMENT/PLAN:     Alopecia Areata: advised on chronic, recurrent nature of condition and diffficulty in treating. Advised on risk of failure to respond to treatment. Discussed topicals vs ILK injections. She would like to proceed with ILK injections in addition to topicals.   --Start fluocinonide solution daily  --Kenalog 5 mg/cc injected to scalp >7 areas treated. Total of 2 cc's used.  Advised on risk of atrophy and failure to respond. Advised on aftercare.            Follow-up: 4-6 weeks for ILK injections  CC:   Scribed By: Lucie Shukla, Medical Scribe    The information in this document, created by the medical scribe for me, accurately reflects the services I personally performed and the decisions made by me. I have reviewed and approved this document for accuracy prior to leaving the patient care area.  January 14, 2020 9:46 AM    Kat Bradley MS, PAMOIRA        Again, thank you for allowing me to participate in the care of your patient.        Sincerely,        Kat Bradley PA-C

## 2020-02-25 ENCOUNTER — OFFICE VISIT (OUTPATIENT)
Dept: DERMATOLOGY | Facility: CLINIC | Age: 55
End: 2020-02-25
Payer: COMMERCIAL

## 2020-02-25 VITALS — OXYGEN SATURATION: 99 % | HEART RATE: 86 BPM | DIASTOLIC BLOOD PRESSURE: 81 MMHG | SYSTOLIC BLOOD PRESSURE: 136 MMHG

## 2020-02-25 DIAGNOSIS — L63.9 AA (ALOPECIA AREATA): Primary | ICD-10-CM

## 2020-02-25 PROCEDURE — 11901 INJECT SKIN LESIONS >7: CPT | Performed by: PHYSICIAN ASSISTANT

## 2020-02-25 NOTE — LETTER
2/25/2020         RE: Anaya Toth  [address]  [place] [address]        Dear Colleague,    Thank you for referring your patient, Anaya Toth, to the [facility]. Please see a copy of my visit note below.    HPI:   Chief complaints: Anaya Toth is a 54 year old female who presents for recheck of hair loss. Using fluocinonide solution BID. New hair growth all over, much improved after ILK at LOV.    Condition present for:  5-6 months.   Previous treatments include: discontinued shampoo and using orange and lemon peel hair product    Review Of Systems  Eyes: negative  Ears/Nose/Throat: negative  Respiratory: No shortness of breath, dyspnea on exertion, cough, or hemoptysis  Cardiovascular: negative  Gastrointestinal: negative  Genitourinary: negative  Musculoskeletal: negative  Neurologic: negative  Psychiatric: negative  Skin: positive for hair loss    HPI, past medical history, social history, allergies, medications reviewed as of February 25, 2020     This document serves as a record of the services and decisions personally performed and made by Kat Bradley, MS, PA-C. It was created on her behalf by Lucie Shukla, a trained medical scribe. The creation of this document is based on the provider's statements to the medical scribe.  Lucie Shukla 9:04 AM February 25, 2020    PHYSICAL EXAM:    /81   Pulse 86   LMP 03/28/2018 (LMP Unknown)   SpO2 99%   Breastfeeding No   Skin exam performed as follows: Type 3 skin. Mood appropriate  Alert and Oriented X 3. Well developed, well nourished in no distress.  General appearance: Normal  Head including face: Normal  Eyes: conjunctiva and lids: Normal  Mouth: Lips, teeth, gums: Normal  Neck: Normal  Chest-breast/axillae: Normal  Back: Normal  Spleen and liver: Normal  Cardiovascular: Exam of peripheral vascular system by observation for swelling, varicosities, edema:  Normal  Genitalia: groin, buttocks: Normal  Extremities: digits/nails (clubbing): Normal  Eccrine and Apocrine glands: Normal  Right upper extremity: Normal  Left upper extremity: Normal  Right lower extremity: Normal  Left lower extremity: Normal  Skin: Scalp and body hair: See below    Hair regrowth on scalp     ASSESSMENT/PLAN:     Alopecia Areata: much hair regrowth today with current regimen and will continue with this. Satisfied with results from ILK injections at LOV and would like again today. Advised on chronic, recurrent nature of condition and diffficulty in treating. Advised on risk of failure to respond to treatment. Discussed topicals vs ILK injections. She would like to proceed with ILK injections in addition to topicals.   --Continue fluocinonide solution - decrease to once daily  --Kenalog 5 mg/cc injected to scalp >7 areas treated. Total of 2 cc's used.  Advised on risk of atrophy and failure to respond. Advised on aftercare.            Follow-up: 2-3 months   CC:   Scribed By: Lucie Shukla, Medical Scribe    The information in this document, created by the medical scribe for me, accurately reflects the services I personally performed and the decisions made by me. I have reviewed and approved this document for accuracy prior to leaving the patient care area.  February 25, 2020 9:12 AM    Kat Bradley MS, JORDY        Again, thank you for allowing me to participate in the care of your patient.        Sincerely,        Kat Bradley PA-C

## 2020-02-25 NOTE — PROGRESS NOTES
HPI:   Chief complaints: Anaya Toth is a 54 year old female who presents for recheck of hair loss. Using fluocinonide solution BID. New hair growth all over, much improved after ILK at LOV.    Condition present for:  5-6 months.   Previous treatments include: discontinued shampoo and using orange and lemon peel hair product    Review Of Systems  Eyes: negative  Ears/Nose/Throat: negative  Respiratory: No shortness of breath, dyspnea on exertion, cough, or hemoptysis  Cardiovascular: negative  Gastrointestinal: negative  Genitourinary: negative  Musculoskeletal: negative  Neurologic: negative  Psychiatric: negative  Skin: positive for hair loss    HPI, past medical history, social history, allergies, medications reviewed as of February 25, 2020     This document serves as a record of the services and decisions personally performed and made by Kat Bradley, MS, PA-C. It was created on her behalf by Lucie Shukla, a trained medical scribe. The creation of this document is based on the provider's statements to the medical scribe.  Lucie Shukla 9:04 AM February 25, 2020    PHYSICAL EXAM:    /81   Pulse 86   LMP 03/28/2018 (LMP Unknown)   SpO2 99%   Breastfeeding No   Skin exam performed as follows: Type 3 skin. Mood appropriate  Alert and Oriented X 3. Well developed, well nourished in no distress.  General appearance: Normal  Head including face: Normal  Eyes: conjunctiva and lids: Normal  Mouth: Lips, teeth, gums: Normal  Neck: Normal  Chest-breast/axillae: Normal  Back: Normal  Spleen and liver: Normal  Cardiovascular: Exam of peripheral vascular system by observation for swelling, varicosities, edema: Normal  Genitalia: groin, buttocks: Normal  Extremities: digits/nails (clubbing): Normal  Eccrine and Apocrine glands: Normal  Right upper extremity: Normal  Left upper extremity: Normal  Right lower extremity: Normal  Left lower extremity: Normal  Skin: Scalp and body  hair: See below    Hair regrowth on scalp     ASSESSMENT/PLAN:     Alopecia Areata: much hair regrowth today with current regimen and will continue with this. Satisfied with results from ILK injections at LOV and would like again today. Advised on chronic, recurrent nature of condition and diffficulty in treating. Advised on risk of failure to respond to treatment. Discussed topicals vs ILK injections. She would like to proceed with ILK injections in addition to topicals.   --Continue fluocinonide solution - decrease to once daily  --Kenalog 5 mg/cc injected to scalp >7 areas treated. Total of 2 cc's used.  Advised on risk of atrophy and failure to respond. Advised on aftercare.            Follow-up: 2-3 months   CC:   Scribed By: Lucie Shukla, Medical Scribe    The information in this document, created by the medical scribe for me, accurately reflects the services I personally performed and the decisions made by me. I have reviewed and approved this document for accuracy prior to leaving the patient care area.  February 25, 2020 9:12 AM    Kat Bradley MS, PA-C

## 2020-03-02 ENCOUNTER — HEALTH MAINTENANCE LETTER (OUTPATIENT)
Age: 55
End: 2020-03-02

## 2020-03-25 DIAGNOSIS — J30.2 SEASONAL ALLERGIC RHINITIS, UNSPECIFIED TRIGGER: Primary | ICD-10-CM

## 2020-03-25 NOTE — TELEPHONE ENCOUNTER
"Requested Prescriptions   Pending Prescriptions Disp Refills     fluticasone (FLONASE) 50 MCG/ACT nasal spray 16 mL 11     Sig: Spray 1-2 sprays into both nostrils daily   Last Written Prescription Date:  07/30/18  Last Fill Quantity: 1,  # refills: 11   Last office visit: 8/28/2019 with prescribing provider:  yes   Future Office Visit:   Next 5 appointments (look out 90 days)    May 26, 2020  9:00 AM CDT  [facility] Visit with Kat Bradley PA-C  [facility] [address] [address] [phone]             Nasal Allergy Protocol Passed - 3/25/2020  1:04 PM        Passed - Patient is age 12 or older        Passed - Recent (12 mo) or future (30 days) visit within the authorizing provider's specialty     Patient has had an office visit with the authorizing provider or a provider within the authorizing providers department within the previous 12 mos or has a future within next 30 days. See \"Patient Info\" tab in inbasket, or \"Choose Columns\" in Meds & Orders section of the refill encounter.              Passed - Medication is active on med list             "

## 2020-03-26 RX ORDER — FLUTICASONE PROPIONATE 50 MCG
1-2 SPRAY, SUSPENSION (ML) NASAL DAILY
Qty: 16 ML | Refills: 11 | Status: SHIPPED | OUTPATIENT
Start: 2020-03-26 | End: 2021-10-25

## 2020-05-26 ENCOUNTER — OFFICE VISIT (OUTPATIENT)
Dept: DERMATOLOGY | Facility: CLINIC | Age: 55
End: 2020-05-26
Payer: COMMERCIAL

## 2020-05-26 VITALS — HEART RATE: 77 BPM | OXYGEN SATURATION: 100 % | DIASTOLIC BLOOD PRESSURE: 77 MMHG | SYSTOLIC BLOOD PRESSURE: 126 MMHG

## 2020-05-26 DIAGNOSIS — L63.9 ALOPECIA AREATA: Primary | ICD-10-CM

## 2020-05-26 PROCEDURE — 99212 OFFICE O/P EST SF 10 MIN: CPT | Performed by: PHYSICIAN ASSISTANT

## 2020-05-26 NOTE — PROGRESS NOTES
HPI:   Chief complaints: Anaya Toth is a 54 year old female who presents for recheck of hair loss. Doing excellent. Using fluocinonide solution daily. New hair growth all over.  Condition present for:  5-6 months.   Previous treatments include: discontinued shampoo and using orange and lemon peel hair product    Review Of Systems  Eyes: negative  Ears/Nose/Throat: negative  Respiratory: No shortness of breath, dyspnea on exertion, cough, or hemoptysis  Cardiovascular: negative  Gastrointestinal: negative  Genitourinary: negative  Musculoskeletal: negative  Neurologic: negative  Psychiatric: negative  Skin: positive for hair loss    HPI, past medical history, social history, allergies, medications reviewed as of May 26, 2020      PHYSICAL EXAM:    /77   Pulse 77   LMP 03/28/2018 (LMP Unknown)   SpO2 100%   Skin exam performed as follows: Type 3 skin. Mood appropriate  Alert and Oriented X 3. Well developed, well nourished in no distress.  General appearance: Normal  Head including face: Normal  Eyes: conjunctiva and lids: Normal  Mouth: Lips, teeth, gums: Normal  Neck: Normal  Chest-breast/axillae: Normal  Back: Normal  Spleen and liver: Normal  Cardiovascular: Exam of peripheral vascular system by observation for swelling, varicosities, edema: Normal  Genitalia: groin, buttocks: Normal  Extremities: digits/nails (clubbing): Normal  Eccrine and Apocrine glands: Normal  Right upper extremity: Normal  Left upper extremity: Normal  Right lower extremity: Normal  Left lower extremity: Normal  Skin: Scalp and body hair: See below    Hair regrowth on scalp     ASSESSMENT/PLAN:     Alopecia Areata: Doing excellent. She is pleased with results. Discussed recurrent nature of condition and diffficulty in treating and that it is difficult to predict when or if it will return. Advised on risk of failure to respond to treatment. Discussed topicals vs ILK injections. She would like to proceed with ILK injections  in addition to topicals.   --Continue fluocinonide solution - decrease to every other day for the next 4 weeks, then stop. If hair loss returns, then resume solution daily and return to clinic.             Follow-up: PRN  CC:   Scribed By: Kat Bradley MS, PAMatthewC

## 2020-05-26 NOTE — PATIENT INSTRUCTIONS
Use the solution on your scalp every other day for the next 4 weeks, then try to stop.     If you hair starts to fall out again, then start to use the solution every day and make an appointment to see me (we can do injections again).

## 2020-05-26 NOTE — LETTER
5/26/2020         RE: Anaya Toth  [address]  [place] [address]        Dear Colleague,    Thank you for referring your patient, Anaya Toth, to the [facility]. Please see a copy of my visit note below.    HPI:   Chief complaints: Anaya Toth is a 54 year old female who presents for recheck of hair loss. Doing excellent. Using fluocinonide solution daily. New hair growth all over.  Condition present for:  5-6 months.   Previous treatments include: discontinued shampoo and using orange and lemon peel hair product    Review Of Systems  Eyes: negative  Ears/Nose/Throat: negative  Respiratory: No shortness of breath, dyspnea on exertion, cough, or hemoptysis  Cardiovascular: negative  Gastrointestinal: negative  Genitourinary: negative  Musculoskeletal: negative  Neurologic: negative  Psychiatric: negative  Skin: positive for hair loss    HPI, past medical history, social history, allergies, medications reviewed as of May 26, 2020      PHYSICAL EXAM:    /77   Pulse 77   LMP 03/28/2018 (LMP Unknown)   SpO2 100%   Skin exam performed as follows: Type 3 skin. Mood appropriate  Alert and Oriented X 3. Well developed, well nourished in no distress.  General appearance: Normal  Head including face: Normal  Eyes: conjunctiva and lids: Normal  Mouth: Lips, teeth, gums: Normal  Neck: Normal  Chest-breast/axillae: Normal  Back: Normal  Spleen and liver: Normal  Cardiovascular: Exam of peripheral vascular system by observation for swelling, varicosities, edema: Normal  Genitalia: groin, buttocks: Normal  Extremities: digits/nails (clubbing): Normal  Eccrine and Apocrine glands: Normal  Right upper extremity: Normal  Left upper extremity: Normal  Right lower extremity: Normal  Left lower extremity: Normal  Skin: Scalp and body hair: See below    Hair regrowth on scalp     ASSESSMENT/PLAN:     Alopecia Areata: Doing excellent. She is pleased with results.  Discussed recurrent nature of condition and diffficulty in treating and that it is difficult to predict when or if it will return. Advised on risk of failure to respond to treatment. Discussed topicals vs ILK injections. She would like to proceed with ILK injections in addition to topicals.   --Continue fluocinonide solution - decrease to every other day for the next 4 weeks, then stop. If hair loss returns, then resume solution daily and return to clinic.             Follow-up: PRN  CC:   Scribed By: Kat Bradley MS, PAMOIRA        Again, thank you for allowing me to participate in the care of your patient.        Sincerely,        Kat Bradley PA-C

## 2020-08-31 ENCOUNTER — OFFICE VISIT (OUTPATIENT)
Dept: INTERNAL MEDICINE | Facility: CLINIC | Age: 55
End: 2020-08-31
Payer: COMMERCIAL

## 2020-08-31 VITALS
SYSTOLIC BLOOD PRESSURE: 121 MMHG | DIASTOLIC BLOOD PRESSURE: 78 MMHG | OXYGEN SATURATION: 99 % | HEART RATE: 68 BPM | WEIGHT: 114 LBS | HEIGHT: 62 IN | BODY MASS INDEX: 20.98 KG/M2 | RESPIRATION RATE: 16 BRPM

## 2020-08-31 DIAGNOSIS — I10 HTN, GOAL BELOW 140/90: ICD-10-CM

## 2020-08-31 DIAGNOSIS — Z00.00 ROUTINE GENERAL MEDICAL EXAMINATION AT A HEALTH CARE FACILITY: Primary | ICD-10-CM

## 2020-08-31 DIAGNOSIS — Z12.31 ENCOUNTER FOR SCREENING MAMMOGRAM FOR BREAST CANCER: ICD-10-CM

## 2020-08-31 DIAGNOSIS — R73.9 BLOOD SUGAR INCREASED: ICD-10-CM

## 2020-08-31 DIAGNOSIS — R09.81 NASAL CONGESTION: ICD-10-CM

## 2020-08-31 DIAGNOSIS — N18.1 CKD (CHRONIC KIDNEY DISEASE) STAGE 1, GFR 90 ML/MIN OR GREATER: ICD-10-CM

## 2020-08-31 DIAGNOSIS — E78.5 HYPERLIPIDEMIA LDL GOAL <130: ICD-10-CM

## 2020-08-31 DIAGNOSIS — Z11.59 SCREENING FOR VIRAL DISEASE: ICD-10-CM

## 2020-08-31 LAB
ERYTHROCYTE [DISTWIDTH] IN BLOOD BY AUTOMATED COUNT: 12 % (ref 10–15)
HBA1C MFR BLD: 6.4 % (ref 0–5.6)
HCT VFR BLD AUTO: 44.6 % (ref 35–47)
HGB BLD-MCNC: 14.5 G/DL (ref 11.7–15.7)
MCH RBC QN AUTO: 30.3 PG (ref 26.5–33)
MCHC RBC AUTO-ENTMCNC: 32.5 G/DL (ref 31.5–36.5)
MCV RBC AUTO: 93 FL (ref 78–100)
PLATELET # BLD AUTO: 228 10E9/L (ref 150–450)
RBC # BLD AUTO: 4.79 10E12/L (ref 3.8–5.2)
WBC # BLD AUTO: 4.5 10E9/L (ref 4–11)

## 2020-08-31 PROCEDURE — 80053 COMPREHEN METABOLIC PANEL: CPT | Performed by: INTERNAL MEDICINE

## 2020-08-31 PROCEDURE — 80061 LIPID PANEL: CPT | Performed by: INTERNAL MEDICINE

## 2020-08-31 PROCEDURE — 99396 PREV VISIT EST AGE 40-64: CPT | Performed by: INTERNAL MEDICINE

## 2020-08-31 PROCEDURE — 82043 UR ALBUMIN QUANTITATIVE: CPT | Performed by: INTERNAL MEDICINE

## 2020-08-31 PROCEDURE — 84443 ASSAY THYROID STIM HORMONE: CPT | Performed by: INTERNAL MEDICINE

## 2020-08-31 PROCEDURE — 83036 HEMOGLOBIN GLYCOSYLATED A1C: CPT | Performed by: INTERNAL MEDICINE

## 2020-08-31 PROCEDURE — 99214 OFFICE O/P EST MOD 30 MIN: CPT | Mod: 25 | Performed by: INTERNAL MEDICINE

## 2020-08-31 PROCEDURE — 85027 COMPLETE CBC AUTOMATED: CPT | Performed by: INTERNAL MEDICINE

## 2020-08-31 PROCEDURE — 36415 COLL VENOUS BLD VENIPUNCTURE: CPT | Performed by: INTERNAL MEDICINE

## 2020-08-31 RX ORDER — LISINOPRIL 10 MG/1
10 TABLET ORAL DAILY
Qty: 90 TABLET | Refills: 4 | Status: SHIPPED | OUTPATIENT
Start: 2020-08-31 | End: 2021-09-09

## 2020-08-31 ASSESSMENT — PATIENT HEALTH QUESTIONNAIRE - PHQ9
SUM OF ALL RESPONSES TO PHQ QUESTIONS 1-9: 5
10. IF YOU CHECKED OFF ANY PROBLEMS, HOW DIFFICULT HAVE THESE PROBLEMS MADE IT FOR YOU TO DO YOUR WORK, TAKE CARE OF THINGS AT HOME, OR GET ALONG WITH OTHER PEOPLE: NOT DIFFICULT AT ALL
SUM OF ALL RESPONSES TO PHQ QUESTIONS 1-9: 5

## 2020-08-31 ASSESSMENT — ENCOUNTER SYMPTOMS
FREQUENCY: 0
DYSURIA: 0
PARESTHESIAS: 0
COUGH: 0
CONSTIPATION: 0
MYALGIAS: 0
PALPITATIONS: 0
JOINT SWELLING: 0
HEMATOCHEZIA: 0
CHILLS: 0
ARTHRALGIAS: 0
SORE THROAT: 0
BREAST MASS: 0
DIARRHEA: 0
FEVER: 0
EYE PAIN: 0
HEARTBURN: 0
NAUSEA: 0
NERVOUS/ANXIOUS: 0
DIZZINESS: 0
HEADACHES: 0
HEMATURIA: 0
SHORTNESS OF BREATH: 0
ABDOMINAL PAIN: 0

## 2020-08-31 ASSESSMENT — MIFFLIN-ST. JEOR: SCORE: 1065.35

## 2020-08-31 NOTE — PATIENT INSTRUCTIONS
Plan:  1. Mammogram ( please call [phone] to schedule it) after Sept 13  2. ENT referral : [facility]: [facility] [phone]  [address]  3. The ENT doctor might ask for Covid test - I ordered the test   4. Continue same meds, same doses for now   5.  Labs today - [address]

## 2020-08-31 NOTE — PROGRESS NOTES
Dr Bender's note    Patient's instructions / PLAN:                                                        Plan:  1. Mammogram ( please call [phone] to schedule it) after Sept 13  2. ENT referral : [facility]: [facility] [phone]  [address]  3. The ENT doctor might ask for Covid test - I ordered the test   4. Continue same meds, same doses for now   5.  Labs today - [address]       ASSESSMENT & PLAN:                                                      (Z00.00) Routine general medical examination at a health care facility  (primary encounter diagnosis)  Comment:   Plan: Comprehensive metabolic panel, CBC with         platelets, Lipid panel reflex to direct LDL         Fasting, Albumin Random Urine Quantitative with        Creat Ratio, TSH with free T4 reflex,         Hemoglobin A1c            (I10) HTN, goal below 140/90  Comment: Controlled   Plan: Comprehensive metabolic panel, CBC with         platelets, Lipid panel reflex to direct LDL         Fasting, Albumin Random Urine Quantitative with        Creat Ratio, TSH with free T4 reflex,         lisinopril (ZESTRIL) 10 MG tablet            (N18.1) CKD (chronic kidney disease) stage 1, GFR 90 ml/min or greater  Comment:   Plan: Comprehensive metabolic panel, CBC with         platelets, Lipid panel reflex to direct LDL         Fasting, Albumin Random Urine Quantitative with        Creat Ratio, TSH with free T4 reflex,         lisinopril (ZESTRIL) 10 MG tablet            (E78.5) Hyperlipidemia LDL goal <130  Comment:   Plan: Comprehensive metabolic panel, CBC with         platelets, Lipid panel reflex to direct LDL         Fasting, TSH with free T4 reflex            (R73.09) Blood sugar increased  Comment:   Plan: Hemoglobin A1c            (R09.81) Nasal congestion  Comment:   Plan: OTOLARYNGOLOGY REFERRAL            (Z11.59) Screening for viral disease  Comment:   Plan:  Asymptomatic COVID-19 Virus (Coronavirus) by         PCR            (Z12.31) Encounter for screening mammogram for breast cancer  Comment:   Plan: MA Screening Digital Bilateral               Chief Complaint:                                                        Annual exam  Follow up chronic medical problems    Due to language barrier, an  was present on the phoneduring the history-taking and subsequent discussion (and for part of the physical exam) with this patient.      SUBJECTIVE:                                                    History of present illness     We reviewed the chronic medical problems as above.   I reviewed the recent tests results in Epic     She checked the BS at home few months ago and it was high. We will check A1c    Nasal congestion   -- she has to breath through mouth at night  -- she has Rx for Flonase for Zyrtec D - with no help  -- x months  -- she would like to see ENT    HLipidemia           ROS:    See below        PMHx: - reviewed  Past Medical History:   Diagnosis Date     CKD (chronic kidney disease) stage 1, GFR 90 ml/min or greater      HTN, goal below 140/90      Syncope        PSHx: reviewed  Past Surgical History:   Procedure Laterality Date     NO HISTORY OF SURGERY          Soc Hx: No daily alcohol, no smoking  Social History     Socioeconomic History     Marital status:      Spouse name: Not on file     Number of children: Not on file     Years of education: Not on file     Highest education level: Not on file   Occupational History     Not on file   Social Needs     Financial resource strain: Not on file     Food insecurity     Worry: Not on file     Inability: Not on file     Transportation needs     Medical: Not on file     Non-medical: Not on file   Tobacco Use     Smoking status: Never Smoker     Smokeless tobacco: Never Used   Substance and Sexual Activity     Alcohol use: No     Drug use: No     Sexual activity: Yes     Partners: Male  "  Lifestyle     Physical activity     Days per week: Not on file     Minutes per session: Not on file     Stress: Not on file   Relationships     Social connections     Talks on phone: Not on file     Gets together: Not on file     Attends [employer] service: Not on file     Active member of club or organization: Not on file     Attends meetings of clubs or organizations: Not on file     Relationship status: Not on file     Intimate partner violence     Fear of current or ex partner: Not on file     Emotionally abused: Not on file     Physically abused: Not on file     Forced sexual activity: Not on file   Other Topics Concern     Parent/sibling w/ CABG, MI or angioplasty before 65F 55M? Not Asked   Social History Narrative     Not on file        Fam Hx: reviewed  Family History   Problem Relation Age of Onset     Hypertension Mother      Hypertension Father      Hypertension Brother      Diabetes Sister 30         Screening: reviewed      All: reviewed    Meds: reviewed  Current Outpatient Medications   Medication Sig Dispense Refill     cetirizine-psuedoePHEDrine (ZYRTEC-D) 5-120 MG per tablet Take 1 tablet by mouth 2 times daily 90 tablet 1     fluocinonide (LIDEX) 0.05 % external solution Apply to AA on the scalp daily 50 mL 3     lisinopril (ZESTRIL) 10 MG tablet Take 1 tablet (10 mg) by mouth, daily. 60 tablet 0     fluticasone (FLONASE) 50 MCG/ACT nasal spray Spray 1-2 sprays into both nostrils daily (Patient not taking: Reported on 8/31/2020) 16 mL 11       OBJECTIVE:                                                    Physical Exam :  Blood pressure 121/78, pulse 68, resp. rate 16, height 1.575 m (5' 2\"), weight 51.7 kg (114 lb), last menstrual period 03/28/2018, SpO2 99 %, not currently breastfeeding.     NAD, appears comfortable  Skin clear, no rashes  Neck: supple, no JVD,  no thyroidmegaly  Lymph nodes non palpable in the cervical, supraclavicular axillaries,  Chest: clear to auscultation with good " respiratory effort  Cardiac: S1S2, RRR, no mgr appreciated  Abdomen: soft, not tender, not distended, audible bowel sound, no hepatosplenomegaly, no palpable masses, no abdominal bruits  Extremities: no cyanosis, clubbing or edema.   Neuro: A, Ox3, no focal signs.  Breast exam in supine and erect position: they are symmetrical, no skin changes, no tenderness or nodes on palpation. Nipples are erect, no skin lesions, no discharge on pressure.    Pelvic exam: deferred, no symptoms, no hx of abnormal pap         Re Bender MD  Internal Medicine        SUBJECTIVE:   CC: Anaya Toth is an 54 year old woman who presents for preventive health visit.     Healthy Habits:     Getting at least 3 servings of Calcium per day:  Yes    Bi-annual eye exam:  Yes    Dental care twice a year:  NO    Sleep apnea or symptoms of sleep apnea:  None    Diet:  Regular (no restrictions)    Frequency of exercise:  2-3 days/week    Duration of exercise:  45-60 minutes    Taking medications regularly:  Yes    Medication side effects:  None    PHQ-2 Total Score: 0    Additional concerns today:  No    Ability to successfully perform activities of daily living: Yes, no assistance needed  Home safety:  none identified   Hearing impairment: No        Hypertension Follow-up      Do you check your blood pressure regularly outside of the clinic? No     Are you following a low salt diet? Yes    Are your blood pressures ever more than 140 on the top number (systolic) OR more   than 90 on the bottom number (diastolic), for example 140/90? No      Today's PHQ-2 Score:   PHQ-2 ( 1999 [employer]) 8/31/2020   Q1: Little interest or pleasure in doing things 0   Q2: Feeling down, depressed or hopeless 0   PHQ-2 Score 0   Q1: Little interest or pleasure in doing things Not at all   Q2: Feeling down, depressed or hopeless Not at all   PHQ-2 Score 0       Abuse: Current or Past (Physical, Sexual or Emotional) - No  Do you feel safe in your environment?  Yes    Have you ever done Advance Care Planning? (For example, a Health Directive, POLST, or a discussion with a medical provider or your loved ones about your wishes): No, advance care planning information given to patient to review.  Advanced care planning was discussed at today's visit.    Social History     Tobacco Use     Smoking status: Never Smoker     Smokeless tobacco: Never Used   Substance Use Topics     Alcohol use: No         Alcohol Use 8/31/2020   Prescreen: >3 drinks/day or >7 drinks/week? No   Prescreen: >3 drinks/day or >7 drinks/week? -       Reviewed orders with patient.  Reviewed health maintenance and updated orders accordingly - Yes  Labs reviewed in EPIC        Pertinent mammograms are reviewed under the imaging tab.  History of abnormal Pap smear: as above   PAP / HPV Latest Ref Rng & Units 7/30/2018   PAP - NIL   HPV 16 DNA NEG:Negative Negative   HPV 18 DNA NEG:Negative Negative   OTHER HR HPV NEG:Negative Negative     Reviewed and updated as needed this visit by clinical staff  Allergies  Meds         Reviewed and updated as needed this visit by Provider            Review of Systems   Constitutional: Negative for chills and fever.   HENT: Positive for congestion. Negative for ear pain, hearing loss and sore throat.    Eyes: Negative for pain and visual disturbance.   Respiratory: Negative for cough and shortness of breath.    Cardiovascular: Negative for chest pain, palpitations and peripheral edema.   Gastrointestinal: Negative for abdominal pain, constipation, diarrhea, heartburn, hematochezia and nausea.   Breasts:  Negative for tenderness, breast mass and discharge.   Genitourinary: Negative for dysuria, frequency, genital sores, hematuria, pelvic pain, urgency, vaginal bleeding and vaginal discharge.   Musculoskeletal: Negative for arthralgias, joint swelling and myalgias.   Skin: Negative for rash.   Neurological: Negative for dizziness, headaches and paresthesias.  "  Psychiatric/Behavioral: Negative for mood changes. The patient is not nervous/anxious.          COUNSELING:  Reviewed preventive health counseling, as reflected in patient instructions       Regular exercise       Healthy diet/nutrition    Estimated body mass index is 21.4 kg/m  as calculated from the following:    Height as of 9/17/19: 1.575 m (5' 2\").    Weight as of 9/17/19: 53.1 kg (117 lb).        She reports that she has never smoked. She has never used smokeless tobacco.      Counseling Resources:  ATP IV Guidelines  Pooled Cohorts Equation Calculator  Breast Cancer Risk Calculator  BRCA-Related Cancer Risk Assessment: FHS-7 Tool  FRAX Risk Assessment  ICSI Preventive Guidelines  Dietary Guidelines for Americans, 2010  [employer]'s MyPlate  ASA Prophylaxis  Lung CA Screening    Re Garrett MD  [facility]  Answers for HPI/ROS submitted by the patient on 8/31/2020   Annual Exam:  If you checked off any problems, how difficult have these problems made it for you to do your work, take care of things at home, or get along with other people?: Not difficult at all  PHQ9 TOTAL SCORE: 5    "

## 2020-09-01 LAB
ALBUMIN SERPL-MCNC: 4 G/DL (ref 3.4–5)
ALP SERPL-CCNC: 79 U/L (ref 40–150)
ALT SERPL W P-5'-P-CCNC: 23 U/L (ref 0–50)
ANION GAP SERPL CALCULATED.3IONS-SCNC: 4 MMOL/L (ref 3–14)
AST SERPL W P-5'-P-CCNC: 18 U/L (ref 0–45)
BILIRUB SERPL-MCNC: 0.4 MG/DL (ref 0.2–1.3)
BUN SERPL-MCNC: 17 MG/DL (ref 7–30)
CALCIUM SERPL-MCNC: 9.6 MG/DL (ref 8.5–10.1)
CHLORIDE SERPL-SCNC: 107 MMOL/L (ref 94–109)
CHOLEST SERPL-MCNC: 200 MG/DL
CO2 SERPL-SCNC: 27 MMOL/L (ref 20–32)
CREAT SERPL-MCNC: 0.74 MG/DL (ref 0.52–1.04)
CREAT UR-MCNC: 28 MG/DL
GFR SERPL CREATININE-BSD FRML MDRD: >90 ML/MIN/{1.73_M2}
GLUCOSE SERPL-MCNC: 110 MG/DL (ref 70–99)
HDLC SERPL-MCNC: 59 MG/DL
LDLC SERPL CALC-MCNC: 122 MG/DL
MICROALBUMIN UR-MCNC: 5 MG/L
MICROALBUMIN/CREAT UR: 18.09 MG/G CR (ref 0–25)
NONHDLC SERPL-MCNC: 141 MG/DL
POTASSIUM SERPL-SCNC: 5.3 MMOL/L (ref 3.4–5.3)
PROT SERPL-MCNC: 8.2 G/DL (ref 6.8–8.8)
SODIUM SERPL-SCNC: 138 MMOL/L (ref 133–144)
TRIGL SERPL-MCNC: 93 MG/DL
TSH SERPL DL<=0.005 MIU/L-ACNC: 0.82 MU/L (ref 0.4–4)

## 2020-09-01 ASSESSMENT — PATIENT HEALTH QUESTIONNAIRE - PHQ9: SUM OF ALL RESPONSES TO PHQ QUESTIONS 1-9: 5

## 2020-09-03 ENCOUNTER — MYC MEDICAL ADVICE (OUTPATIENT)
Dept: INTERNAL MEDICINE | Facility: CLINIC | Age: 55
End: 2020-09-03

## 2020-09-03 DIAGNOSIS — R73.03 PREDIABETES: Primary | ICD-10-CM

## 2020-09-04 ENCOUNTER — TRANSFERRED RECORDS (OUTPATIENT)
Dept: HEALTH INFORMATION MANAGEMENT | Facility: CLINIC | Age: 55
End: 2020-09-04

## 2020-09-04 NOTE — TELEPHONE ENCOUNTER
[facility] see [facility]. Patient requesting a summary of lab results. Patient advised to allow 7 business days for PCP to review lab results.   Jaquelin Garcia RN

## 2020-12-20 ENCOUNTER — HEALTH MAINTENANCE LETTER (OUTPATIENT)
Age: 55
End: 2020-12-20

## 2021-04-24 ENCOUNTER — HEALTH MAINTENANCE LETTER (OUTPATIENT)
Age: 56
End: 2021-04-24

## 2021-10-03 ENCOUNTER — HEALTH MAINTENANCE LETTER (OUTPATIENT)
Age: 56
End: 2021-10-03

## 2021-10-11 ENCOUNTER — DOCUMENTATION ONLY (OUTPATIENT)
Dept: LAB | Facility: CLINIC | Age: 56
End: 2021-10-11

## 2021-10-11 DIAGNOSIS — Z13.29 THYROID DISORDER SCREENING: ICD-10-CM

## 2021-10-11 DIAGNOSIS — Z13.0 SCREENING FOR DEFICIENCY ANEMIA: ICD-10-CM

## 2021-10-11 DIAGNOSIS — Z13.1 SCREENING FOR DIABETES MELLITUS: Primary | ICD-10-CM

## 2021-10-11 DIAGNOSIS — Z13.220 SCREENING CHOLESTEROL LEVEL: ICD-10-CM

## 2021-10-11 DIAGNOSIS — Z11.4 ENCOUNTER FOR SCREENING FOR HIV: ICD-10-CM

## 2021-10-11 NOTE — PROGRESS NOTES
Anaya Toth has an upcoming lab appointment:    Future Appointments   Date Time Provider Department [place]   10/21/2021  8:30 AM RV LAB RVLABR RV   10/25/2021  7:10 AM Rosario Schreiber, CNP RVFP RV     Patient is scheduled for the following lab(s): PX LABS, HEP. C    Patient either has no future order, or has Health Maintenance labs due. Please review and place either future orders or HMPO (Review of Health Maintenance Protocol Orders), as appropriate.    Health Maintenance Due   Topic     ANNUAL REVIEW OF HM ORDERS      HIV SCREENING      Naomi Morton

## 2021-10-21 ENCOUNTER — LAB (OUTPATIENT)
Dept: LAB | Facility: CLINIC | Age: 56
End: 2021-10-21
Payer: COMMERCIAL

## 2021-10-21 DIAGNOSIS — Z13.0 SCREENING FOR DEFICIENCY ANEMIA: ICD-10-CM

## 2021-10-21 DIAGNOSIS — Z13.29 THYROID DISORDER SCREENING: ICD-10-CM

## 2021-10-21 DIAGNOSIS — Z11.4 ENCOUNTER FOR SCREENING FOR HIV: ICD-10-CM

## 2021-10-21 DIAGNOSIS — Z13.220 SCREENING CHOLESTEROL LEVEL: ICD-10-CM

## 2021-10-21 DIAGNOSIS — Z13.1 SCREENING FOR DIABETES MELLITUS: ICD-10-CM

## 2021-10-21 LAB
ERYTHROCYTE [DISTWIDTH] IN BLOOD BY AUTOMATED COUNT: 12 % (ref 10–15)
HCT VFR BLD AUTO: 42.1 % (ref 35–47)
HGB BLD-MCNC: 13.8 G/DL (ref 11.7–15.7)
HIV 1+2 AB+HIV1 P24 AG SERPL QL IA: NONREACTIVE
MCH RBC QN AUTO: 30.1 PG (ref 26.5–33)
MCHC RBC AUTO-ENTMCNC: 32.8 G/DL (ref 31.5–36.5)
MCV RBC AUTO: 92 FL (ref 78–100)
PLATELET # BLD AUTO: 201 10E3/UL (ref 150–450)
RBC # BLD AUTO: 4.59 10E6/UL (ref 3.8–5.2)
WBC # BLD AUTO: 5.1 10E3/UL (ref 4–11)

## 2021-10-21 PROCEDURE — 36415 COLL VENOUS BLD VENIPUNCTURE: CPT

## 2021-10-21 PROCEDURE — 84443 ASSAY THYROID STIM HORMONE: CPT

## 2021-10-21 PROCEDURE — 87389 HIV-1 AG W/HIV-1&-2 AB AG IA: CPT

## 2021-10-21 PROCEDURE — 80061 LIPID PANEL: CPT

## 2021-10-21 PROCEDURE — 85027 COMPLETE CBC AUTOMATED: CPT

## 2021-10-21 PROCEDURE — 80053 COMPREHEN METABOLIC PANEL: CPT

## 2021-10-23 LAB
ALBUMIN SERPL-MCNC: 4.2 G/DL (ref 3.4–5)
ALP SERPL-CCNC: 66 U/L (ref 40–150)
ALT SERPL W P-5'-P-CCNC: 30 U/L (ref 0–50)
ANION GAP SERPL CALCULATED.3IONS-SCNC: 6 MMOL/L (ref 3–14)
AST SERPL W P-5'-P-CCNC: 25 U/L (ref 0–45)
BILIRUB SERPL-MCNC: 0.5 MG/DL (ref 0.2–1.3)
BUN SERPL-MCNC: 16 MG/DL (ref 7–30)
CALCIUM SERPL-MCNC: 9.5 MG/DL (ref 8.5–10.1)
CHLORIDE BLD-SCNC: 106 MMOL/L (ref 94–109)
CHOLEST SERPL-MCNC: 201 MG/DL
CO2 SERPL-SCNC: 27 MMOL/L (ref 20–32)
CREAT SERPL-MCNC: 0.73 MG/DL (ref 0.52–1.04)
FASTING STATUS PATIENT QL REPORTED: ABNORMAL
GFR SERPL CREATININE-BSD FRML MDRD: >90 ML/MIN/1.73M2
GLUCOSE BLD-MCNC: 79 MG/DL (ref 70–99)
HDLC SERPL-MCNC: 63 MG/DL
LDLC SERPL CALC-MCNC: 120 MG/DL
NONHDLC SERPL-MCNC: 138 MG/DL
POTASSIUM BLD-SCNC: 3.9 MMOL/L (ref 3.4–5.3)
PROT SERPL-MCNC: 8.1 G/DL (ref 6.8–8.8)
SODIUM SERPL-SCNC: 139 MMOL/L (ref 133–144)
TRIGL SERPL-MCNC: 91 MG/DL
TSH SERPL DL<=0.005 MIU/L-ACNC: 1.83 MU/L (ref 0.4–4)

## 2021-10-25 ENCOUNTER — OFFICE VISIT (OUTPATIENT)
Dept: FAMILY MEDICINE | Facility: CLINIC | Age: 56
End: 2021-10-25
Payer: COMMERCIAL

## 2021-10-25 VITALS
HEART RATE: 85 BPM | DIASTOLIC BLOOD PRESSURE: 68 MMHG | RESPIRATION RATE: 16 BRPM | BODY MASS INDEX: 21.05 KG/M2 | HEIGHT: 62 IN | TEMPERATURE: 97.6 F | WEIGHT: 114.4 LBS | SYSTOLIC BLOOD PRESSURE: 122 MMHG | OXYGEN SATURATION: 99 %

## 2021-10-25 DIAGNOSIS — Z12.31 ENCOUNTER FOR SCREENING MAMMOGRAM FOR MALIGNANT NEOPLASM OF BREAST: Primary | ICD-10-CM

## 2021-10-25 PROCEDURE — 99396 PREV VISIT EST AGE 40-64: CPT | Performed by: NURSE PRACTITIONER

## 2021-10-25 ASSESSMENT — ENCOUNTER SYMPTOMS
HEMATURIA: 0
FEVER: 0
CONSTIPATION: 0
JOINT SWELLING: 0
FREQUENCY: 0
HEADACHES: 0
NERVOUS/ANXIOUS: 0
DIARRHEA: 0
COUGH: 0
WEAKNESS: 0
DYSURIA: 0
BREAST MASS: 0
MYALGIAS: 0
PARESTHESIAS: 0
ARTHRALGIAS: 0
EYE PAIN: 0
SHORTNESS OF BREATH: 0
DIZZINESS: 0
ABDOMINAL PAIN: 0
SORE THROAT: 0
HEMATOCHEZIA: 0
CHILLS: 0
NAUSEA: 0
HEARTBURN: 0
PALPITATIONS: 0

## 2021-10-25 ASSESSMENT — MIFFLIN-ST. JEOR: SCORE: 1054.22

## 2021-10-25 NOTE — PROGRESS NOTES
SUBJECTIVE:   CC: Anaya Toth is an 55 year old woman who presents for preventive health visit.       Patient has been advised of split billing requirements and indicates understanding: Yes  Healthy Habits:     Getting at least 3 servings of Calcium per day:  Yes    Bi-annual eye exam:  Yes    Dental care twice a year:  NO    Sleep apnea or symptoms of sleep apnea:  None    Diet:  Regular (no restrictions)    Frequency of exercise:  None    Taking medications regularly:  Yes    Medication side effects:  Not applicable    PHQ-2 Total Score: 0    Additional concerns today:  Yes          Today's PHQ-2 Score:   PHQ-2 ( 1999 Pfizer) 10/18/2021   Q1: Little interest or pleasure in doing things 0   Q2: Feeling down, depressed or hopeless 0   PHQ-2 Score 0   Q1: Little interest or pleasure in doing things -   Q2: Feeling down, depressed or hopeless -   PHQ-2 Score -       Abuse: Current or Past (Physical, Sexual or Emotional) - No  Do you feel safe in your environment? Yes        Social History     Tobacco Use     Smoking status: Never Smoker     Smokeless tobacco: Never Used   Substance Use Topics     Alcohol use: No     If you drink alcohol do you typically have >3 drinks per day or >7 drinks per week? no    Alcohol Use 10/18/2021   Prescreen: >3 drinks/day or >7 drinks/week? No   Prescreen: >3 drinks/day or >7 drinks/week? -   No flowsheet data found.    Reviewed orders with patient.  Reviewed health maintenance and updated orders accordingly - Yes  Labs reviewed in EPIC    Breast Cancer Screening:    FHS-7:   Breast CA Risk Assessment (FHS-7) 10/18/2021   Did any of your first-degree relatives have breast or ovarian cancer? No   Did any of your relatives have bilateral breast cancer? No   Did any man in your family have breast cancer? No   Did any woman in your family have breast and ovarian cancer? No   Did any woman in your family have breast cancer before age 50 y? No   Do you have 2 or more relatives with  breast and/or ovarian cancer? No   Do you have 2 or more relatives with breast and/or bowel cancer? No       Mammogram Screening: Recommended mammography every 1-2 years with patient discussion and risk factor consideration  Pertinent mammograms are reviewed under the imaging tab.    History of abnormal Pap smear: NO - age 30-65 PAP every 5 years with negative HPV co-testing recommended  PAP / HPV Latest Ref Rng & Units 7/30/2018   PAP (Historical) - NIL   HPV16 NEG:Negative Negative   HPV18 NEG:Negative Negative   HRHPV NEG:Negative Negative     Reviewed and updated as needed this visit by clinical staff                 Reviewed and updated as needed this visit by Provider                Past Medical History:   Diagnosis Date     CKD (chronic kidney disease) stage 1, GFR 90 ml/min or greater      HTN, goal below 140/90      Syncope       Past Surgical History:   Procedure Laterality Date     NO HISTORY OF SURGERY         Review of Systems   Constitutional: Negative for chills and fever.   HENT: Negative for congestion, ear pain, hearing loss and sore throat.    Eyes: Negative for pain and visual disturbance.   Respiratory: Negative for cough and shortness of breath.    Cardiovascular: Negative for chest pain, palpitations and peripheral edema.   Gastrointestinal: Negative for abdominal pain, constipation, diarrhea, heartburn, hematochezia and nausea.   Breasts:  Negative for tenderness, breast mass and discharge.   Genitourinary: Negative for dysuria, frequency, genital sores, hematuria, pelvic pain, urgency, vaginal bleeding and vaginal discharge.   Musculoskeletal: Negative for arthralgias, joint swelling and myalgias.   Skin: Negative for rash.   Neurological: Negative for dizziness, weakness, headaches and paresthesias.   Psychiatric/Behavioral: Negative for mood changes. The patient is not nervous/anxious.      CONSTITUTIONAL: NEGATIVE for fever, chills, change in weight  INTEGUMENTARU/SKIN: NEGATIVE for  worrisome rashes, moles or lesions  EYES: NEGATIVE for vision changes or irritation  ENT: NEGATIVE for ear, mouth and throat problems  RESP: NEGATIVE for significant cough or SOB  BREAST: NEGATIVE for masses, tenderness or discharge  CV: NEGATIVE for chest pain, palpitations or peripheral edema  GI: NEGATIVE for nausea, abdominal pain, heartburn, or change in bowel habits  : NEGATIVE for unusual urinary or vaginal symptoms. Periods are regular.  MUSCULOSKELETAL: NEGATIVE for significant arthralgias or myalgia  NEURO: NEGATIVE for weakness, dizziness or paresthesias  PSYCHIATRIC: NEGATIVE for changes in mood or affect     OBJECTIVE:   LMP 03/28/2018 (LMP Unknown)   Physical Exam  GENERAL: healthy, alert and no distress  EYES: Eyes grossly normal to inspection, PERRL and conjunctivae and sclerae normal  HENT: ear canals and TM's normal, nose and mouth without ulcers or lesions  NECK: no adenopathy, no asymmetry, masses, or scars and thyroid normal to palpation  RESP: lungs clear to auscultation - no rales, rhonchi or wheezes  BREAST: normal without masses, tenderness or nipple discharge and no palpable axillary masses or adenopathy  CV: regular rate and rhythm, normal S1 S2, no S3 or S4, no murmur, click or rub, no peripheral edema and peripheral pulses strong  ABDOMEN: soft, nontender, no hepatosplenomegaly, no masses and bowel sounds normal  MS: no gross musculoskeletal defects noted, no edema  SKIN: no suspicious lesions or rashes  NEURO: Normal strength and tone, mentation intact and speech normal  PSYCH: mentation appears normal, affect normal/bright    Diagnostic Test Results:  Labs reviewed in Epic    ASSESSMENT/PLAN:   Anaya was seen today for physical.    Diagnoses and all orders for this visit:    Encounter for screening mammogram for malignant neoplasm of breast  -     MA SCREENING DIGITAL BILAT - Future  (s+30); Future        Patient has been advised of split billing requirements and indicates  "understanding: Yes  COUNSELING:  Reviewed preventive health counseling, as reflected in patient instructions  Special attention given to:        Regular exercise       Healthy diet/nutrition       Osteoporosis prevention/bone health       Colon cancer screening       HIV screeninx in teen years, 1x in adult years, and at intervals if high risk       (Mariah)menopause management    Estimated body mass index is 20.85 kg/m  as calculated from the following:    Height as of 20: 1.575 m (5' 2\").    Weight as of 20: 51.7 kg (114 lb).        She reports that she has never smoked. She has never used smokeless tobacco.      Counseling Resources:  ATP IV Guidelines  Pooled Cohorts Equation Calculator  Breast Cancer Risk Calculator  BRCA-Related Cancer Risk Assessment: FHS-7 Tool  FRAX Risk Assessment  ICSI Preventive Guidelines  Dietary Guidelines for Americans, 2010  USDA's MyPlate  ASA Prophylaxis  Lung CA Screening    Rosario Schreiber, ANDRE  [facility]  "

## 2021-11-22 ENCOUNTER — HOSPITAL ENCOUNTER (OUTPATIENT)
Dept: MAMMOGRAPHY | Facility: CLINIC | Age: 56
Discharge: HOME | End: 2021-11-22
Attending: NURSE PRACTITIONER | Admitting: NURSE PRACTITIONER
Payer: COMMERCIAL

## 2021-11-22 DIAGNOSIS — Z12.31 ENCOUNTER FOR SCREENING MAMMOGRAM FOR MALIGNANT NEOPLASM OF BREAST: ICD-10-CM

## 2021-11-22 PROCEDURE — 77067 SCR MAMMO BI INCL CAD: CPT

## 2021-11-28 ENCOUNTER — MYC MEDICAL ADVICE (OUTPATIENT)
Dept: FAMILY MEDICINE | Facility: CLINIC | Age: 56
End: 2021-11-28
Payer: COMMERCIAL

## 2021-11-29 NOTE — RESULT ENCOUNTER NOTE
Dear Anaya,    Here is a summary of your recent test results:    -Mammogram was normal.  ADVISE: rechecking in 1 year.    In addition, here is a list of due or overdue Health Maintenance reminders:    Urine Test Never done  Zoster (Shingles) Vaccine(1 of 2) Never done  Kidney Microalbumin Urine Test due on 08/31/2021    Please call us at [phone] (or use [employer]) to address the above recommendations or have any questions.    Thank you for choosing [facility].  It was an honor and a privilege to participate in your care today.     Healthy regards,    QASIM Nolen (covering for Rosario Schreiber)

## 2022-01-07 ENCOUNTER — MYC REFILL (OUTPATIENT)
Dept: INTERNAL MEDICINE | Facility: CLINIC | Age: 57
End: 2022-01-07
Payer: COMMERCIAL

## 2022-01-07 DIAGNOSIS — I10 HTN, GOAL BELOW 140/90: ICD-10-CM

## 2022-01-07 DIAGNOSIS — N18.1 CKD (CHRONIC KIDNEY DISEASE) STAGE 1, GFR 90 ML/MIN OR GREATER: ICD-10-CM

## 2022-01-10 RX ORDER — LISINOPRIL 10 MG/1
10 TABLET ORAL DAILY
Qty: 90 TABLET | Refills: 0 | OUTPATIENT
Start: 2022-01-10

## 2022-01-11 ENCOUNTER — MYC MEDICAL ADVICE (OUTPATIENT)
Dept: FAMILY MEDICINE | Facility: CLINIC | Age: 57
End: 2022-01-11
Payer: COMMERCIAL

## 2022-09-10 ENCOUNTER — HEALTH MAINTENANCE LETTER (OUTPATIENT)
Age: 57
End: 2022-09-10

## 2022-10-07 ENCOUNTER — DOCUMENTATION ONLY (OUTPATIENT)
Dept: LAB | Facility: CLINIC | Age: 57
End: 2022-10-07

## 2022-10-07 DIAGNOSIS — I10 HTN, GOAL BELOW 140/90: Primary | ICD-10-CM

## 2022-10-07 DIAGNOSIS — Z13.0 SCREENING FOR DEFICIENCY ANEMIA: ICD-10-CM

## 2022-10-07 DIAGNOSIS — N18.1 CKD (CHRONIC KIDNEY DISEASE) STAGE 1, GFR 90 ML/MIN OR GREATER: ICD-10-CM

## 2022-10-07 DIAGNOSIS — Z13.29 THYROID DISORDER SCREENING: ICD-10-CM

## 2022-10-07 DIAGNOSIS — Z13.220 SCREENING CHOLESTEROL LEVEL: ICD-10-CM

## 2022-10-07 NOTE — PROGRESS NOTES
Anaya Toth has an upcoming lab appointment:    Future Appointments   Date Time Provider Department Center   10/17/2022  8:00 AM RV LAB RVLABR RV   10/27/2022  8:30 AM Rosario Schreiber, CNP RVFP RV     Patient is scheduled for the following lab(s): PT HAS LAB VISIT FOE PRE PX LABS, NEED ORDERS    There is no order available. Please review and place either future orders or HMPO (Review of Health Maintenance Protocol Orders), as appropriate.    Health Maintenance Due   Topic     MICROALBUMIN      ANNUAL REVIEW OF HM ORDERS      BMP      LIPID      Naomi Morton

## 2022-10-17 ENCOUNTER — LAB (OUTPATIENT)
Dept: LAB | Facility: CLINIC | Age: 57
End: 2022-10-17
Payer: COMMERCIAL

## 2022-10-17 DIAGNOSIS — Z13.220 SCREENING FOR HYPERLIPIDEMIA: ICD-10-CM

## 2022-10-17 DIAGNOSIS — I10 HTN, GOAL BELOW 140/90: Primary | ICD-10-CM

## 2022-10-17 DIAGNOSIS — Z13.0 SCREENING FOR DEFICIENCY ANEMIA: ICD-10-CM

## 2022-10-17 DIAGNOSIS — Z13.29 THYROID DISORDER SCREENING: ICD-10-CM

## 2022-10-17 DIAGNOSIS — N18.1 CKD (CHRONIC KIDNEY DISEASE) STAGE 1, GFR 90 ML/MIN OR GREATER: ICD-10-CM

## 2022-10-17 DIAGNOSIS — Z13.220 SCREENING CHOLESTEROL LEVEL: ICD-10-CM

## 2022-10-17 LAB
ALBUMIN SERPL-MCNC: 3.8 G/DL (ref 3.4–5)
ALBUMIN UR-MCNC: NEGATIVE MG/DL
ALP SERPL-CCNC: 66 U/L (ref 40–150)
ALT SERPL W P-5'-P-CCNC: 24 U/L (ref 0–50)
ANION GAP SERPL CALCULATED.3IONS-SCNC: 8 MMOL/L (ref 3–14)
APPEARANCE UR: CLEAR
AST SERPL W P-5'-P-CCNC: 19 U/L (ref 0–45)
BILIRUB SERPL-MCNC: 0.3 MG/DL (ref 0.2–1.3)
BILIRUB UR QL STRIP: NEGATIVE
BUN SERPL-MCNC: 13 MG/DL (ref 7–30)
CALCIUM SERPL-MCNC: 9.1 MG/DL (ref 8.5–10.1)
CHLORIDE BLD-SCNC: 105 MMOL/L (ref 94–109)
CHOLEST SERPL-MCNC: 158 MG/DL
CO2 SERPL-SCNC: 25 MMOL/L (ref 20–32)
COLOR UR AUTO: YELLOW
CREAT SERPL-MCNC: 0.52 MG/DL (ref 0.52–1.04)
CREAT UR-MCNC: 12 MG/DL
ERYTHROCYTE [DISTWIDTH] IN BLOOD BY AUTOMATED COUNT: 12.3 % (ref 10–15)
FASTING STATUS PATIENT QL REPORTED: YES
GFR SERPL CREATININE-BSD FRML MDRD: >90 ML/MIN/1.73M2
GLUCOSE BLD-MCNC: 98 MG/DL (ref 70–99)
GLUCOSE UR STRIP-MCNC: NEGATIVE MG/DL
HCT VFR BLD AUTO: 39.6 % (ref 35–47)
HDLC SERPL-MCNC: 59 MG/DL
HGB BLD-MCNC: 13.1 G/DL (ref 11.7–15.7)
HGB UR QL STRIP: NEGATIVE
KETONES UR STRIP-MCNC: NEGATIVE MG/DL
LDLC SERPL CALC-MCNC: 85 MG/DL
LEUKOCYTE ESTERASE UR QL STRIP: NEGATIVE
MCH RBC QN AUTO: 30.5 PG (ref 26.5–33)
MCHC RBC AUTO-ENTMCNC: 33.1 G/DL (ref 31.5–36.5)
MCV RBC AUTO: 92 FL (ref 78–100)
MICROALBUMIN UR-MCNC: <5 MG/L
MICROALBUMIN/CREAT UR: NORMAL MG/G{CREAT}
NITRATE UR QL: NEGATIVE
NONHDLC SERPL-MCNC: 99 MG/DL
PH UR STRIP: 7 [PH] (ref 5–7)
PLATELET # BLD AUTO: 202 10E3/UL (ref 150–450)
POTASSIUM BLD-SCNC: 3.8 MMOL/L (ref 3.4–5.3)
PROT SERPL-MCNC: 7.6 G/DL (ref 6.8–8.8)
RBC # BLD AUTO: 4.3 10E6/UL (ref 3.8–5.2)
SODIUM SERPL-SCNC: 138 MMOL/L (ref 133–144)
SP GR UR STRIP: 1.01 (ref 1–1.03)
TRIGL SERPL-MCNC: 71 MG/DL
TSH SERPL DL<=0.005 MIU/L-ACNC: 1.87 MU/L (ref 0.4–4)
UROBILINOGEN UR STRIP-ACNC: 0.2 E.U./DL
WBC # BLD AUTO: 4.6 10E3/UL (ref 4–11)

## 2022-10-17 PROCEDURE — 36415 COLL VENOUS BLD VENIPUNCTURE: CPT

## 2022-10-17 PROCEDURE — 81003 URINALYSIS AUTO W/O SCOPE: CPT

## 2022-10-17 PROCEDURE — 80061 LIPID PANEL: CPT

## 2022-10-17 PROCEDURE — 82043 UR ALBUMIN QUANTITATIVE: CPT

## 2022-10-17 PROCEDURE — 80053 COMPREHEN METABOLIC PANEL: CPT

## 2022-10-17 PROCEDURE — 85027 COMPLETE CBC AUTOMATED: CPT

## 2022-10-17 PROCEDURE — 84443 ASSAY THYROID STIM HORMONE: CPT

## 2022-10-27 ENCOUNTER — OFFICE VISIT (OUTPATIENT)
Dept: FAMILY MEDICINE | Facility: CLINIC | Age: 57
End: 2022-10-27
Payer: COMMERCIAL

## 2022-10-27 VITALS
OXYGEN SATURATION: 100 % | SYSTOLIC BLOOD PRESSURE: 114 MMHG | TEMPERATURE: 97.2 F | DIASTOLIC BLOOD PRESSURE: 62 MMHG | HEIGHT: 62 IN | BODY MASS INDEX: 20.98 KG/M2 | HEART RATE: 94 BPM | WEIGHT: 114 LBS

## 2022-10-27 DIAGNOSIS — Z00.00 ROUTINE PHYSICAL EXAMINATION: Primary | ICD-10-CM

## 2022-10-27 DIAGNOSIS — Z12.31 ENCOUNTER FOR SCREENING MAMMOGRAM FOR MALIGNANT NEOPLASM OF BREAST: ICD-10-CM

## 2022-10-27 DIAGNOSIS — I10 HTN, GOAL BELOW 140/90: ICD-10-CM

## 2022-10-27 DIAGNOSIS — N18.1 CKD (CHRONIC KIDNEY DISEASE) STAGE 1, GFR 90 ML/MIN OR GREATER: ICD-10-CM

## 2022-10-27 PROCEDURE — 99396 PREV VISIT EST AGE 40-64: CPT | Performed by: NURSE PRACTITIONER

## 2022-10-27 RX ORDER — LISINOPRIL 10 MG/1
10 TABLET ORAL DAILY
Qty: 90 TABLET | Refills: 3 | Status: SHIPPED | OUTPATIENT
Start: 2022-10-27 | End: 2023-11-07

## 2022-10-27 NOTE — Clinical Note
Please abstract the following data from this visit with this patient into the appropriate field in Epic:  Tests that can be patient reported without a hard copy:  Colonoscopy done on this date: 2018 (approximately), by this group: [facility], results were polyps.   Other Tests found in the patient's chart through Chart Review/Care Everywhere:    Note to Abstraction: If this section is blank, no results were found via Chart Review/Care Everywhere.

## 2022-10-27 NOTE — Clinical Note
Please abstract the following data from this visit with this patient into the appropriate field in Epic:  Tests that can be patient reported without a hard copy:  Colonoscopy done on this date: 2018 (approximately), by this group: [facility], results were polyps.   Other Tests found in the patient's chart through Chart Review/Care Everywhere:    Note to Abstraction: If this section is blank, no results were found via Chart Review/Care Everywhere

## 2022-10-27 NOTE — PROGRESS NOTES
SUBJECTIVE:   CC: Anaya is an 56 year old who presents for preventive health visit.     Patient has been advised of split billing requirements and indicates understanding: Yes  Healthy Habits:    Getting at least 3 servings of Calcium per day:  Yes    Bi-annual eye exam:  NO    Dental care twice a year:  Yes    Sleep apnea or symptoms of sleep apnea:  None    Diet:  Regular (no restrictions)    Frequency of exercise:  4-5 days/week    Duration of exercise:  30-45 minutes    Taking medications regularly:  Yes    Barriers to taking medications:  None    Medication side effects:  None    PHQ-2 Total Score:    Additional concerns today:  Yes     -just wants refills of her medication with ample refills for the year    Hypertension Follow-up      Do you check your blood pressure regularly outside of the clinic? No     Are you following a low salt diet? Yes    Are your blood pressures ever more than 140 on the top number (systolic) OR more   than 90 on the bottom number (diastolic), for example 140/90? No      Today's PHQ-2 Score:   PHQ-2 ( 1999 Pfizer) 10/27/2022   Q1: Little interest or pleasure in doing things 0   Q2: Feeling down, depressed or hopeless 0   PHQ-2 Score 0   PHQ-2 Total Score (12-17 Years)- Positive if 3 or more points; Administer PHQ-A if positive -   Q1: Little interest or pleasure in doing things -   Q2: Feeling down, depressed or hopeless -   PHQ-2 Score -       Abuse: Current or Past (Physical, Sexual or Emotional) - No  Do you feel safe in your environment? Yes        Social History     Tobacco Use     Smoking status: Never     Smokeless tobacco: Never   Substance Use Topics     Alcohol use: No     If you drink alcohol do you typically have >3 drinks per day or >7 drinks per week? No    Alcohol Use 10/27/2022   Prescreen: >3 drinks/day or >7 drinks/week? -   Prescreen: >3 drinks/day or >7 drinks/week? No       Reviewed orders with patient.  Reviewed health maintenance and updated orders  "accordingly - Yes  Labs reviewed in EPIC    Breast Cancer Screening:    Breast CA Risk Assessment (FHS-7) 10/18/2021 10/25/2021   Do you have a family history of breast, colon, or ovarian cancer? Yes No / Unknown         Mammogram Screening: Recommended mammography every 1-2 years with patient discussion and risk factor consideration  Pertinent mammograms are reviewed under the imaging tab.    History of abnormal Pap smear: NO - age 30-65 PAP every 5 years with negative HPV co-testing recommended  PAP / HPV Latest Ref Rng & Units 7/30/2018   PAP (Historical) - NIL   HPV16 NEG:Negative Negative   HPV18 NEG:Negative Negative   HRHPV NEG:Negative Negative     Reviewed and updated as needed this visit by clinical staff   Tobacco  Allergies  Meds  Problems  Med Hx  Surg Hx  Fam Hx          Reviewed and updated as needed this visit by Provider   Tobacco  Allergies  Meds  Problems  Med Hx  Surg Hx  Fam Hx         Past Medical History:   Diagnosis Date     CKD (chronic kidney disease) stage 1, GFR 90 ml/min or greater      HTN, goal below 140/90      Syncope       Past Surgical History:   Procedure Laterality Date     NO HISTORY OF SURGERY         Review of Systems     OBJECTIVE:   /62 (BP Location: Left arm, Cuff Size: Adult Regular)   Pulse 94   Temp 97.2  F (36.2  C) (Tympanic)   Ht 1.562 m (5' 1.5\")   Wt 51.7 kg (114 lb)   LMP 03/28/2018 (LMP Unknown)   SpO2 100%   BMI 21.19 kg/m    Physical Exam  GENERAL: healthy, alert and no distress  EYES: Eyes grossly normal to inspection, PERRL and conjunctivae and sclerae normal  HENT: ear canals and TM's normal, nose and mouth without ulcers or lesions  NECK: no adenopathy, no asymmetry, masses, or scars and thyroid normal to palpation  RESP: lungs clear to auscultation - no rales, rhonchi or wheezes  BREAST: normal without masses, tenderness or nipple discharge and no palpable axillary masses or adenopathy  CV: regular rate and rhythm, normal S1 S2, " "no S3 or S4, no murmur, click or rub, no peripheral edema and peripheral pulses strong  ABDOMEN: soft, nontender, no hepatosplenomegaly, no masses and bowel sounds normal  MS: no gross musculoskeletal defects noted, no edema  SKIN: no suspicious lesions or rashes  NEURO: Normal strength and tone, mentation intact and speech normal  PSYCH: mentation appears normal, affect normal/bright    Diagnostic Test Results:  Labs reviewed in Epic    ASSESSMENT/PLAN:   Anaya was seen today for physical.    Diagnoses and all orders for this visit:    Routine physical examination    Encounter for screening mammogram for malignant neoplasm of breast  -     MA Screening Digital Bilateral; Future    CKD (chronic kidney disease) stage 1, GFR 90 ml/min or greater  Labs stable  -     lisinopril (ZESTRIL) 10 MG tablet; Take 1 tablet (10 mg) by mouth daily    HTN, goal below 140/90  -     lisinopril (ZESTRIL) 10 MG tablet; Take 1 tablet (10 mg) by mouth daily        Patient has been advised of split billing requirements and indicates understanding: Yes      COUNSELING:  Reviewed preventive health counseling, as reflected in patient instructions  Special attention given to:        Regular exercise       Healthy diet/nutrition    Estimated body mass index is 21.19 kg/m  as calculated from the following:    Height as of this encounter: 1.562 m (5' 1.5\").    Weight as of this encounter: 51.7 kg (114 lb).        She reports that she has never smoked. She has never used smokeless tobacco.      Counseling Resources:  ATP IV Guidelines  Pooled Cohorts Equation Calculator  Breast Cancer Risk Calculator  BRCA-Related Cancer Risk Assessment: FHS-7 Tool  FRAX Risk Assessment  ICSI Preventive Guidelines  Dietary Guidelines for Americans, 2010  USDA's MyPlate  ASA Prophylaxis  Lung CA Screening    Rosario Schreiber, ANDRE  [facility]  "

## 2023-03-07 ENCOUNTER — APPOINTMENT (OUTPATIENT)
Dept: INTERPRETER SERVICES | Facility: CLINIC | Age: 58
End: 2023-03-07
Payer: COMMERCIAL

## 2023-03-16 ENCOUNTER — ANCILLARY PROCEDURE (OUTPATIENT)
Dept: MAMMOGRAPHY | Facility: CLINIC | Age: 58
End: 2023-03-16
Attending: NURSE PRACTITIONER
Payer: COMMERCIAL

## 2023-03-16 PROCEDURE — 77067 SCR MAMMO BI INCL CAD: CPT | Mod: TC | Performed by: RADIOLOGY

## 2023-04-27 ENCOUNTER — TELEPHONE (OUTPATIENT)
Dept: GASTROENTEROLOGY | Facility: CLINIC | Age: 58
End: 2023-04-27
Payer: COMMERCIAL

## 2023-04-27 NOTE — TELEPHONE ENCOUNTER
Screening Questions  BLUE  KIND OF PREP RED  LOCATION [review exclusion criteria] GREEN  SEDATION TYPE        y Are you active on mychart?       Rosario Schreiber CNP in [facility] Ordering/Referring Provider?        BCBS What type of coverage do you have?      n Have you had a positive covid test in the last 14 days?     22.7  1. BMI  [BMI 40+ - review exclusion criteria& smart-phrase document]    y  2. Are you able to give consent for your medical care? [IF NO,RN REVIEW]          n  3. Are you taking any prescription pain medications on a routine schedule   (ex narcotics: oxycodone, roxicodone, oxycontin,  and percocet)? [RN Review]        n  3a. EXTENDED PREP What kind of prescription?     n 4. Do you have any chemical dependencies such as alcohol, street drugs, or methadone?        **If yes 3- 5 , please schedule with MAC sedation.**          IF YES TO ANY 6 - 10 - HOSPITAL SETTING ONLY.     n 6.   Do you need assistance transferring?     n 7.   Have you had a heart or lung transplant?    n 8.   Are you currently on dialysis?   n 9.   Do you use daily home oxygen?   n 10. Do you take nitroglycerin?   10a. n If yes, how often?     11. [FEMALES]  n Are you currently pregnant?    11a. n If yes, how many weeks? [ Greater than 12 weeks, OR NEEDED]    n 12. Do you have Pulmonary Hypertension? *NEED PAC APPT AT [facility] w/ MAC*     n 13. [review exclusion criteria]  Do you have any implantable devices in your body (pacemaker, defib, LVAD)?    n 14. In the past 6 months, have you had any heart related issues including cardiomyopathy or heart attack?     14a. n If yes, did it require cardiac stenting if so when?     n 15. Have you had a stroke or Transient ischemic attack (TIA - aka  mini stroke ) within 6 months?      n 16. Do you have mod to severe Obstructive Sleep Apnea?  [Hospital only]    n 17. Do you have SEVERE AND UNCONTROLLED asthma? *NEED PAC APPT AT [facility] w/MAC*     18. Are you currently taking any  "blood thinners?     18a. No. Continue to 19.   18b. Yes/no Blood Thinner: No [CONTINUE TO #19]    n 19. Do you take the medication Phentermine?    19a. If yes, \"Hold for 7 days before procedure.  Please consult your prescribing provider if you have questions about holding this medication.\"     y  20. Do you have chronic kidney disease?      n  21. Do you have a diagnosis of diabetes?     n  22. On a regular basis do you go 3-5 days between bowel movements?      23. Preferred LOCAL Pharmacy for Pre Prescription    [ LIST ONLY ONE PHARMACY]     [id number] [place] [place] - [address] [address]        - [facility] REMINDERS -    Informed patient they will need an adult    Cannot take any type of public or medical transportation alone    Conscious Sedation- Needs  for 6 hours after the procedure       MAC/General-Needs  for 24 hours after procedure    Pre-Procedure Covid test to be completed [[facility] PCR Testing Required]    Confirmed Nurse will call to complete assessment       - SCHEDULING DETAILS -  n Hospital Setting Required? If yes, what is the exclusion?: lynn Navarro  Surgeon    06/21/2023  Date of Procedure  Lower Endoscopy [Colonoscopy]  [facility]ATORADE WITHOUT MAGNEISUM CITRATE Which Colonoscopy Prep was Sent?     moderate Sedation Type     n PAC / Pre-op Required                 "

## 2023-04-28 ENCOUNTER — TELEPHONE (OUTPATIENT)
Dept: GASTROENTEROLOGY | Facility: CLINIC | Age: 58
End: 2023-04-28
Payer: COMMERCIAL

## 2023-04-28 NOTE — TELEPHONE ENCOUNTER
Caller: Anaya Toth    Reason for Reschedule/Cancellation (please be detailed, any staff messages or encounters to note?): SCHEDULE CONFLICT       Prior to reschedule please review:    Ordering Provider:Rosario Schreiber CNP     Sedation per order:CS    Does patient have any ASC Exclusions, please identify?: N      Notes on Cancelled Procedure:    Procedure:Lower Endoscopy [Colonoscopy]     Date: 06/21/2023    Location:[facility]; [address] [place] [address]    Surgeon: JORGE        Rescheduled: Yes    Procedure: Lower Endoscopy [Colonoscopy]     Date: 06/30/2023    Location:[facility]; [address] [place] [address]    Surgeon: HAKEEM    Sedation Level Scheduled  CS,  Reason for Sedation Level ORDER    Prep/Instructions updated and sent: MADDIE

## 2023-04-28 NOTE — TELEPHONE ENCOUNTER
Caller: Martir  Reason for Reschedule/Cancellation (please be detailed, any staff messages or encounters to note?): ride not available      Prior to reschedule please review:    Ordering Provider:Candie    Sedation per order:CS    Does patient have any ASC Exclusions, please identify?: n      Notes on Cancelled Procedure:    Procedure:Lower Endoscopy [Colonoscopy]     Date: 6/21    Location:[facility]; [address] [place] [address]    Surgeon: Francisco        Rescheduled: Yes    Procedure: Lower Endoscopy [Colonoscopy]     Date: 6/26    Location:[facility]; [address] [place] [address]    Surgeon: Melanie    Sedation Level Scheduled  CS,  Reason for Sedation Level order    Prep/Instructions updated and sent:

## 2023-06-26 ENCOUNTER — HOSPITAL ENCOUNTER (OUTPATIENT)
Facility: CLINIC | Age: 58
Discharge: HOME | End: 2023-06-26
Attending: COLON & RECTAL SURGERY | Admitting: COLON & RECTAL SURGERY
Payer: COMMERCIAL

## 2023-06-26 VITALS
BODY MASS INDEX: 20.98 KG/M2 | OXYGEN SATURATION: 98 % | RESPIRATION RATE: 17 BRPM | HEIGHT: 62 IN | WEIGHT: 114 LBS | HEART RATE: 72 BPM | SYSTOLIC BLOOD PRESSURE: 100 MMHG | DIASTOLIC BLOOD PRESSURE: 57 MMHG

## 2023-06-26 LAB — COLONOSCOPY: NORMAL

## 2023-06-26 PROCEDURE — G0500 MOD SEDAT ENDO SERVICE >5YRS: HCPCS | Performed by: COLON & RECTAL SURGERY

## 2023-06-26 PROCEDURE — G0121 COLON CA SCRN NOT HI RSK IND: HCPCS | Performed by: COLON & RECTAL SURGERY

## 2023-06-26 PROCEDURE — 45378 DIAGNOSTIC COLONOSCOPY: CPT | Performed by: COLON & RECTAL SURGERY

## 2023-06-26 PROCEDURE — 250N000011 HC RX IP 250 OP 636: Performed by: COLON & RECTAL SURGERY

## 2023-06-26 RX ORDER — ONDANSETRON 2 MG/ML
4 INJECTION INTRAMUSCULAR; INTRAVENOUS
Status: DISCONTINUED | OUTPATIENT
Start: 2023-06-26 | End: 2023-06-26 | Stop reason: HOSPADM

## 2023-06-26 RX ORDER — LIDOCAINE 40 MG/G
CREAM TOPICAL
Status: DISCONTINUED | OUTPATIENT
Start: 2023-06-26 | End: 2023-06-26 | Stop reason: HOSPADM

## 2023-06-26 RX ORDER — FENTANYL CITRATE 50 UG/ML
INJECTION, SOLUTION INTRAMUSCULAR; INTRAVENOUS PRN
Status: DISCONTINUED | OUTPATIENT
Start: 2023-06-26 | End: 2023-06-26 | Stop reason: HOSPADM

## 2023-06-26 ASSESSMENT — ACTIVITIES OF DAILY LIVING (ADL): ADLS_ACUITY_SCORE: 35

## 2023-06-26 NOTE — H&P
Colon & Rectal Surgery History and Physical  Pre-Endoscopy Procedure Note    History of Present Illness   I have been asked by Rosario Schreiber to evaluate this 57 year old female for colorectal polyp surveillance. She currently denies any abdominal pain, weight loss, bleeding per rectum, or recent change in bowel habits.    Past Medical History  Diagnosis Date     CKD (chronic kidney disease) stage 1, GFR 90 ml/min or greater      HTN      Syncope        Past Surgical History  Procedure Laterality Date     NO HISTORY OF SURGERY          Medications  Medication Sig     cetirizine-psuedoePHEDrine (ZYRTEC-D) 5-120 MG per tablet Take 1 tablet by mouth 2 times daily     lisinopril (ZESTRIL) 10 MG tablet Take 1 tablet (10 mg) by mouth daily       Allergies   No Known Allergies     Family History   Family history includes Diabetes (age of onset: 30) in her sister; Hypertension in her brother, father, and mother.     Social History   She reports that she has never smoked. She has never used smokeless tobacco. She reports that she does not drink alcohol and does not use drugs.    Review of Systems   Constitutional:  No fever, weight change or fatigue.    Eyes:     No dry eyes or vision changes.   Ears/Nose/Throat/Neck:  No oral ulcers, sore throat or voice change.    Cardiovascular:   No palpitations, syncope, angina or edema.   Respiratory:    No chest pain, excessive sleepiness, shortness of breath or hemoptysis.    Gastrointestinal:   No abdominal pain, nausea, vomiting, diarrhea or heartburn.    Genitourinary:   No dysuria, hematuria, urinary retention or urinary frequency.   Musculoskeletal:  No joint swelling or arthralgias.    Dermatologic:  No skin rash or other skin changes.   Neurologic:    No focal weakness or numbness. No neuropathy.   Psychiatric:    No depression, anxiety, suicidal ideation, or paranoid ideation.   Endocrine:   No cold or heat intolerance, polydipsia, hirsutism, change in libido, or  "flushing.   Hematology/Lymphatic:  No bleeding or lymphadenopathy.    Allergy/Immunology:  No rhinitis or hives.     Physical Exam   Vitals:  /72 HR 64 RR 16, height 1.575 m (5' 2\"), weight 51.7 kg (114 lb), last menstrual period 03/28/2018, not currently breastfeeding.    General:  Alert and oriented to person, place and time   Airway: Normal oropharyngeal airway and neck mobility   Lungs:  Clear bilaterally   Heart:  Regular rate and rhythm   Abdomen: Soft, NT, ND, no masses   Extremities: Warm, good capillary refill    ASA Grade: II (mild systemic disease)    Impression: Cleared for use of conscious sedation for colorectal cancer screening    Plan: Proceed with colonoscopy     Deann Navarro MD  [facility]  [phone]  "

## 2023-11-02 ENCOUNTER — DOCUMENTATION ONLY (OUTPATIENT)
Dept: FAMILY MEDICINE | Facility: CLINIC | Age: 58
End: 2023-11-02
Payer: COMMERCIAL

## 2023-11-02 DIAGNOSIS — Z13.220 SCREENING CHOLESTEROL LEVEL: Primary | ICD-10-CM

## 2023-11-02 DIAGNOSIS — Z13.0 SCREENING FOR DEFICIENCY ANEMIA: ICD-10-CM

## 2023-11-02 DIAGNOSIS — N18.1 CKD (CHRONIC KIDNEY DISEASE) STAGE 1, GFR 90 ML/MIN OR GREATER: ICD-10-CM

## 2023-11-02 DIAGNOSIS — I10 HTN, GOAL BELOW 140/90: ICD-10-CM

## 2023-11-02 DIAGNOSIS — Z13.29 THYROID DISORDER SCREENING: ICD-10-CM

## 2023-11-02 DIAGNOSIS — Z13.1 SCREENING FOR DIABETES MELLITUS: ICD-10-CM

## 2023-11-07 DIAGNOSIS — I10 HTN, GOAL BELOW 140/90: ICD-10-CM

## 2023-11-07 DIAGNOSIS — N18.1 CKD (CHRONIC KIDNEY DISEASE) STAGE 1, GFR 90 ML/MIN OR GREATER: ICD-10-CM

## 2023-11-07 RX ORDER — LISINOPRIL 10 MG/1
10 TABLET ORAL DAILY
Qty: 90 TABLET | Refills: 0 | Status: SHIPPED | OUTPATIENT
Start: 2023-11-07 | End: 2024-01-15

## 2023-11-07 NOTE — TELEPHONE ENCOUNTER
Medication filled 1 time as pt is due for a follow-up in clinic. Pharmacy has been notified to inform patient to call clinic and schedule appointment.    Prescription approved per [facility] Refill Protocol.  Cheyanne Hernandez RN  [facility] Triage Nurse

## 2023-12-10 ENCOUNTER — HEALTH MAINTENANCE LETTER (OUTPATIENT)
Age: 58
End: 2023-12-10

## 2024-01-08 ENCOUNTER — LAB (OUTPATIENT)
Dept: LAB | Facility: CLINIC | Age: 59
End: 2024-01-08
Payer: COMMERCIAL

## 2024-01-08 DIAGNOSIS — Z13.1 SCREENING FOR DIABETES MELLITUS: ICD-10-CM

## 2024-01-08 DIAGNOSIS — Z13.29 THYROID DISORDER SCREENING: ICD-10-CM

## 2024-01-08 DIAGNOSIS — Z13.220 SCREENING CHOLESTEROL LEVEL: ICD-10-CM

## 2024-01-08 DIAGNOSIS — N18.1 CKD (CHRONIC KIDNEY DISEASE) STAGE 1, GFR 90 ML/MIN OR GREATER: ICD-10-CM

## 2024-01-08 DIAGNOSIS — Z13.0 SCREENING FOR DEFICIENCY ANEMIA: ICD-10-CM

## 2024-01-08 DIAGNOSIS — I10 HTN, GOAL BELOW 140/90: ICD-10-CM

## 2024-01-08 LAB
ALBUMIN SERPL BCG-MCNC: 4.2 G/DL (ref 3.5–5.2)
ALBUMIN UR-MCNC: NEGATIVE MG/DL
ALP SERPL-CCNC: 69 U/L (ref 40–150)
ALT SERPL W P-5'-P-CCNC: 12 U/L (ref 0–50)
ANION GAP SERPL CALCULATED.3IONS-SCNC: 11 MMOL/L (ref 7–15)
APPEARANCE UR: CLEAR
AST SERPL W P-5'-P-CCNC: 22 U/L (ref 0–45)
BILIRUB SERPL-MCNC: 0.5 MG/DL
BILIRUB UR QL STRIP: NEGATIVE
BUN SERPL-MCNC: 16.3 MG/DL (ref 8–23)
CALCIUM SERPL-MCNC: 9.3 MG/DL (ref 8.6–10)
CHLORIDE SERPL-SCNC: 104 MMOL/L (ref 98–107)
CHOLEST SERPL-MCNC: 185 MG/DL
COLOR UR AUTO: YELLOW
CREAT SERPL-MCNC: 0.61 MG/DL (ref 0.51–0.95)
CREAT UR-MCNC: 101 MG/DL
DEPRECATED HCO3 PLAS-SCNC: 27 MMOL/L (ref 22–29)
EGFRCR SERPLBLD CKD-EPI 2021: >90 ML/MIN/1.73M2
ERYTHROCYTE [DISTWIDTH] IN BLOOD BY AUTOMATED COUNT: 12.1 % (ref 10–15)
FASTING STATUS PATIENT QL REPORTED: YES
GLUCOSE SERPL-MCNC: 95 MG/DL (ref 70–99)
GLUCOSE UR STRIP-MCNC: NEGATIVE MG/DL
HCT VFR BLD AUTO: 41.8 % (ref 35–47)
HDLC SERPL-MCNC: 50 MG/DL
HGB BLD-MCNC: 13.6 G/DL (ref 11.7–15.7)
HGB UR QL STRIP: ABNORMAL
KETONES UR STRIP-MCNC: NEGATIVE MG/DL
LDLC SERPL CALC-MCNC: 119 MG/DL
LEUKOCYTE ESTERASE UR QL STRIP: ABNORMAL
MCH RBC QN AUTO: 30.6 PG (ref 26.5–33)
MCHC RBC AUTO-ENTMCNC: 32.5 G/DL (ref 31.5–36.5)
MCV RBC AUTO: 94 FL (ref 78–100)
MICROALBUMIN UR-MCNC: <12 MG/L
MICROALBUMIN/CREAT UR: NORMAL MG/G{CREAT}
NITRATE UR QL: NEGATIVE
NONHDLC SERPL-MCNC: 135 MG/DL
PH UR STRIP: 6.5 [PH] (ref 5–7)
PLATELET # BLD AUTO: 198 10E3/UL (ref 150–450)
POTASSIUM SERPL-SCNC: 4 MMOL/L (ref 3.4–5.3)
PROT SERPL-MCNC: 7.1 G/DL (ref 6.4–8.3)
RBC # BLD AUTO: 4.45 10E6/UL (ref 3.8–5.2)
RBC #/AREA URNS AUTO: NORMAL /HPF
SODIUM SERPL-SCNC: 142 MMOL/L (ref 135–145)
SP GR UR STRIP: 1.02 (ref 1–1.03)
TRIGL SERPL-MCNC: 81 MG/DL
TSH SERPL DL<=0.005 MIU/L-ACNC: 1.58 UIU/ML (ref 0.3–4.2)
UROBILINOGEN UR STRIP-ACNC: 0.2 E.U./DL
WBC # BLD AUTO: 5 10E3/UL (ref 4–11)
WBC #/AREA URNS AUTO: NORMAL /HPF

## 2024-01-08 PROCEDURE — 84443 ASSAY THYROID STIM HORMONE: CPT

## 2024-01-08 PROCEDURE — 36415 COLL VENOUS BLD VENIPUNCTURE: CPT

## 2024-01-08 PROCEDURE — 80061 LIPID PANEL: CPT

## 2024-01-08 PROCEDURE — 81001 URINALYSIS AUTO W/SCOPE: CPT

## 2024-01-08 PROCEDURE — 80053 COMPREHEN METABOLIC PANEL: CPT

## 2024-01-08 PROCEDURE — 82570 ASSAY OF URINE CREATININE: CPT

## 2024-01-08 PROCEDURE — 85027 COMPLETE CBC AUTOMATED: CPT

## 2024-01-08 PROCEDURE — 82043 UR ALBUMIN QUANTITATIVE: CPT

## 2024-01-15 ENCOUNTER — OFFICE VISIT (OUTPATIENT)
Dept: FAMILY MEDICINE | Facility: CLINIC | Age: 59
End: 2024-01-15
Payer: COMMERCIAL

## 2024-01-15 VITALS
OXYGEN SATURATION: 99 % | HEART RATE: 101 BPM | RESPIRATION RATE: 16 BRPM | SYSTOLIC BLOOD PRESSURE: 120 MMHG | HEIGHT: 62 IN | TEMPERATURE: 97.2 F | DIASTOLIC BLOOD PRESSURE: 72 MMHG | WEIGHT: 116 LBS | BODY MASS INDEX: 21.35 KG/M2

## 2024-01-15 DIAGNOSIS — R31.29 MICROSCOPIC HEMATURIA: ICD-10-CM

## 2024-01-15 DIAGNOSIS — N18.1 CKD (CHRONIC KIDNEY DISEASE) STAGE 1, GFR 90 ML/MIN OR GREATER: ICD-10-CM

## 2024-01-15 DIAGNOSIS — Z00.00 ROUTINE GENERAL MEDICAL EXAMINATION AT A HEALTH CARE FACILITY: Primary | ICD-10-CM

## 2024-01-15 DIAGNOSIS — Z12.31 ENCOUNTER FOR SCREENING MAMMOGRAM FOR MALIGNANT NEOPLASM OF BREAST: ICD-10-CM

## 2024-01-15 DIAGNOSIS — R31.1 BENIGN ESSENTIAL MICROSCOPIC HEMATURIA: ICD-10-CM

## 2024-01-15 DIAGNOSIS — I10 HTN, GOAL BELOW 140/90: ICD-10-CM

## 2024-01-15 PROCEDURE — 99396 PREV VISIT EST AGE 40-64: CPT | Performed by: NURSE PRACTITIONER

## 2024-01-15 RX ORDER — LISINOPRIL 10 MG/1
10 TABLET ORAL DAILY
Qty: 90 TABLET | Refills: 3 | Status: SHIPPED | OUTPATIENT
Start: 2024-01-15

## 2024-01-15 ASSESSMENT — ENCOUNTER SYMPTOMS
SHORTNESS OF BREATH: 0
HEARTBURN: 0
PARESTHESIAS: 0
DIARRHEA: 0
CHILLS: 0
NAUSEA: 0
ARTHRALGIAS: 1
JOINT SWELLING: 1
HEADACHES: 0
COUGH: 0
PALPITATIONS: 0
WEAKNESS: 0
DIZZINESS: 0
ABDOMINAL PAIN: 0
MYALGIAS: 1
CONSTIPATION: 0
SORE THROAT: 0
HEMATURIA: 0
EYE PAIN: 0
BREAST MASS: 0
FEVER: 0
HEMATOCHEZIA: 0
DYSURIA: 0
NERVOUS/ANXIOUS: 0
FREQUENCY: 1

## 2024-01-15 NOTE — PROGRESS NOTES
SUBJECTIVE:   Anaya is a 58 year old, presenting for the following:  Physical        1/15/2024    10:23 AM   Additional Questions   Roomed by Maude LOYOLA CMA       Healthy Habits:     Getting at least 3 servings of Calcium per day:  Yes    Bi-annual eye exam:  NO    Dental care twice a year:  Yes    Sleep apnea or symptoms of sleep apnea:  None    Diet:  Low fat/cholesterol    Frequency of exercise:  None    Taking medications regularly:  Yes    Medication side effects:  None    Additional concerns today:  Yes      Today's PHQ-2 Score:       1/15/2024    10:16 AM   PHQ-2 ( 1999 [employer])   Q1: Little interest or pleasure in doing things 0   Q2: Feeling down, depressed or hopeless 0   PHQ-2 Score 0   Q1: Little interest or pleasure in doing things Not at all   Q2: Feeling down, depressed or hopeless Not at all   PHQ-2 Score 0       Hypertension Follow-up    Do you check your blood pressure regularly outside of the clinic? Yes   Are you following a low salt diet? Yes  Are your blood pressures ever more than 140 on the top number (systolic) OR more   than 90 on the bottom number (diastolic), for example 140/90? No      Social History     Tobacco Use    Smoking status: Never    Smokeless tobacco: Never   Substance Use Topics    Alcohol use: No             1/15/2024    10:16 AM   Alcohol Use   Prescreen: >3 drinks/day or >7 drinks/week? No     Reviewed orders with patient.  Reviewed health maintenance and updated orders accordingly - Yes  Labs reviewed in EPIC    Breast Cancer Screening:        10/18/2021     7:25 AM 10/25/2021     7:06 AM   Breast CA Risk Assessment (FHS-7)   Do you have a family history of breast, colon, or ovarian cancer? Yes No / Unknown         Mammogram Screening: Recommended mammography every 1-2 years with patient discussion and risk factor consideration  Pertinent mammograms are reviewed under the imaging tab.    History of abnormal Pap smear: NO - age 30-65 PAP every 5 years with negative HPV  "co-testing recommended      Latest Ref Rng & Units 9/12/2019    12:00 AM 7/30/2018     9:46 AM 7/30/2018     9:45 AM   PAP / HPV   PAP (Historical)   NIL     HPV 16 DNA NEG^Negative   Negative    HPV 18 DNA NEG^Negative   Negative    Other HR HPV NEG^Negative   Negative    PAP-ABSTRACT  See Scanned Document             This result is from an external source.     Reviewed and updated as needed this visit by clinical staff    Allergies  Meds              Reviewed and updated as needed this visit by Provider                     Review of Systems   Constitutional:  Negative for chills and fever.   HENT:  Negative for congestion, ear pain, hearing loss and sore throat.    Eyes:  Negative for pain and visual disturbance.   Respiratory:  Negative for cough and shortness of breath.    Cardiovascular:  Negative for chest pain, palpitations and peripheral edema.   Gastrointestinal:  Negative for abdominal pain, constipation, diarrhea, heartburn, hematochezia and nausea.   Breasts:  Negative for tenderness, breast mass and discharge.   Genitourinary:  Positive for frequency. Negative for dysuria, genital sores, hematuria, pelvic pain, urgency, vaginal bleeding and vaginal discharge.   Musculoskeletal:  Positive for arthralgias, joint swelling and myalgias.   Skin:  Negative for rash.   Neurological:  Negative for dizziness, weakness, headaches and paresthesias.   Psychiatric/Behavioral:  Negative for mood changes. The patient is not nervous/anxious.           OBJECTIVE:   /72   Pulse 101   Temp 97.2  F (36.2  C) (Tympanic)   Resp 16   Ht 1.575 m (5' 2\")   Wt 52.6 kg (116 lb)   LMP 03/28/2018 (LMP Unknown)   SpO2 99%   BMI 21.22 kg/m    Physical Exam  GENERAL: healthy, alert and no distress  EYES: Eyes grossly normal to inspection, PERRL and conjunctivae and sclerae normal  HENT: ear canals and TM's normal, nose and mouth without ulcers or lesions  NECK: no adenopathy, no asymmetry, masses, or scars and thyroid " normal to palpation  RESP: lungs clear to auscultation - no rales, rhonchi or wheezes  BREAST: normal without masses, tenderness or nipple discharge and no palpable axillary masses or adenopathy  CV: regular rate and rhythm, normal S1 S2, no S3 or S4, no murmur, click or rub, no peripheral edema and peripheral pulses strong  ABDOMEN: soft, nontender, no hepatosplenomegaly, no masses and bowel sounds normal  MS: no gross musculoskeletal defects noted, no edema  SKIN: no suspicious lesions or rashes  NEURO: Normal strength and tone, mentation intact and speech normal  PSYCH: mentation appears normal, affect normal/bright    Diagnostic Test Results:  Labs reviewed in Epic    ASSESSMENT/PLAN:   Anaya was seen today for physical.    Diagnoses and all orders for this visit:    Routine general medical examination at a health care facility    Encounter for screening mammogram for malignant neoplasm of breast  -     MA Screening Bilateral w/ Osorio; Future    Benign essential microscopic hematuria  Recheck urine 2 weeks.  -     **UA reflex to Microscopic FUTURE 14d; Future    CKD (chronic kidney disease) stage 1, GFR 90 ml/min or greater  -     lisinopril (ZESTRIL) 10 MG tablet; Take 1 tablet (10 mg) by mouth daily    HTN, goal below 140/90  -     lisinopril (ZESTRIL) 10 MG tablet; Take 1 tablet (10 mg) by mouth daily    Other orders  -     REVIEW OF HEALTH MAINTENANCE PROTOCOL ORDERS  -     PRIMARY CARE FOLLOW-UP SCHEDULING; Future        Patient has been advised of split billing requirements and indicates understanding: Yes      COUNSELING:  Reviewed preventive health counseling, as reflected in patient instructions        She reports that she has never smoked. She has never used smokeless tobacco.          Rosario Schreiber, CNP  [facility]

## 2024-01-29 ENCOUNTER — LAB (OUTPATIENT)
Dept: LAB | Facility: CLINIC | Age: 59
End: 2024-01-29
Payer: COMMERCIAL

## 2024-01-29 DIAGNOSIS — R31.1 BENIGN ESSENTIAL MICROSCOPIC HEMATURIA: ICD-10-CM

## 2024-01-29 LAB
ALBUMIN UR-MCNC: NEGATIVE MG/DL
APPEARANCE UR: CLEAR
BILIRUB UR QL STRIP: NEGATIVE
COLOR UR AUTO: YELLOW
GLUCOSE UR STRIP-MCNC: NEGATIVE MG/DL
HGB UR QL STRIP: ABNORMAL
KETONES UR STRIP-MCNC: NEGATIVE MG/DL
LEUKOCYTE ESTERASE UR QL STRIP: ABNORMAL
NITRATE UR QL: NEGATIVE
PH UR STRIP: 6 [PH] (ref 5–7)
RBC #/AREA URNS AUTO: NORMAL /HPF
SP GR UR STRIP: 1.01 (ref 1–1.03)
UROBILINOGEN UR STRIP-ACNC: 0.2 E.U./DL
WBC #/AREA URNS AUTO: NORMAL /HPF

## 2024-01-29 PROCEDURE — 81001 URINALYSIS AUTO W/SCOPE: CPT

## 2024-02-01 NOTE — TELEPHONE ENCOUNTER
MEDICAL RECORDS REQUEST   [facility]  Urology Clinic  [address] [place] [address]  PHONE: [phone]  Fax: [phone]        FUTURE VISIT INFORMATION                                                   Anaya Raphaelen, : 1965 scheduled for future visit at [facility]    APPOINTMENT INFORMATION:  Date: 2024  Provider:  Paulo Dia MD  Reason for Visit/Diagnosis: Microscopic hematuria    REFERRAL INFORMATION:  Referring provider:  Rosario Schreiber CNP in [facility]      RECORDS REQUESTED FOR VISIT                                                     NOTES  STATUS/DETAILS   OFFICE NOTE from referring provider  yes, 01/15/2024 -- Rosario Schreiber CNP in [facility]   MEDICATION LIST  yes   LABS     URINALYSIS (UA)  yes     PRE-VISIT CHECKLIST      Joint diagnostic appointment coordinated correctly          (ensure right order & amount of time) Yes   RECORD COLLECTION COMPLETE Yes

## 2024-02-09 ENCOUNTER — PRE VISIT (OUTPATIENT)
Dept: UROLOGY | Facility: CLINIC | Age: 59
End: 2024-02-09
Payer: COMMERCIAL

## 2024-02-09 ENCOUNTER — OFFICE VISIT (OUTPATIENT)
Dept: ONCOLOGY | Facility: CLINIC | Age: 59
End: 2024-02-09
Attending: UROLOGY
Payer: COMMERCIAL

## 2024-02-09 VITALS
WEIGHT: 115.1 LBS | HEART RATE: 83 BPM | OXYGEN SATURATION: 99 % | TEMPERATURE: 98.4 F | DIASTOLIC BLOOD PRESSURE: 79 MMHG | SYSTOLIC BLOOD PRESSURE: 141 MMHG | BODY MASS INDEX: 21.05 KG/M2 | RESPIRATION RATE: 16 BRPM

## 2024-02-09 DIAGNOSIS — R31.29 MICROSCOPIC HEMATURIA: ICD-10-CM

## 2024-02-09 PROCEDURE — 99204 OFFICE O/P NEW MOD 45 MIN: CPT | Mod: 25 | Performed by: UROLOGY

## 2024-02-09 PROCEDURE — 99213 OFFICE O/P EST LOW 20 MIN: CPT | Performed by: UROLOGY

## 2024-02-09 PROCEDURE — 52000 CYSTOURETHROSCOPY: CPT | Performed by: UROLOGY

## 2024-02-09 ASSESSMENT — PAIN SCALES - GENERAL: PAINLEVEL: MILD PAIN (3)

## 2024-02-09 NOTE — PROGRESS NOTES
Nursing Note:  Anaya Toth presents today for cystoscopy.  Patient seen by provider today: Yes: .   present during visit today: Yes, Language: Chilean.     Note: N/A.    Labs:  Not Applicable.     Procedure:  Urology Procedure: Cystoscopy.    Verified:  Time out included verbal and active participation of all team members: Yes.      Post Procedure:  Patient tolerated the procedure without incident..    Post Pain Assessment: 0 out of 10.    Discharge Plan:   Departure Mode: Ambulatory.    Chetna Antonio, CMA

## 2024-02-09 NOTE — LETTER
2/9/2024         RE: Anaya Toth  [address]  [place] [address]        Dear Colleague,    Thank you for referring your patient, Anaya Toth, to the [facility]. Please see a copy of my visit note below.    Urology clinic   Hematuria               Chief Complaint:   Hematuria           Consult or Referral:     Anaya Toth is a 58 year old female seen in consultation from Dr. Schreiber.         History of Present Illness:    Anaya Toth is a very pleasant 58 year old female who presents with a history of microscopic hematuria which was first noted 10 days ago.  She has not had similar symptoms in the past.There are not associated urinary symptoms.  she denies any flank or abdominal pain.  The hematuria is not associated with vigorous exercise.    Concerning typical risk factors for urinary tract malignancies, the patient does not have a family history of  malignancies.  She recalls that her mother had blood in her urine just before her passing.  She has not received pelvic radiation, exposure to known carcinogenic agents such as benzenes, aromatic amines, alkylating agents or chronic indwelling foreign bodies in the urinary tract in the past.  Furthermore she does not have a history of recurrent urinary tract infections in the past.    The patient is not currently smoking cigarettes.  she is not a former smoker.          Past Medical History:     Past Medical History:   Diagnosis Date     CKD (chronic kidney disease) stage 1, GFR 90 ml/min or greater      HTN, goal below 140/90      Syncope             Past Surgical History:     Past Surgical History:   Procedure Laterality Date     COLONOSCOPY       COLONOSCOPY N/A 6/26/2023    Procedure: Colonoscopy;  Surgeon: Deann Navarro MD;  Location:  GI     NO HISTORY OF SURGERY              Medications     Current Outpatient Medications   Medication     calcium citrate-vitamin D (CITRACAL)  200-6.25 MG-MCG TABS per tablet     cetirizine-psuedoePHEDrine (ZYRTEC-D) 5-120 MG per tablet     lisinopril (ZESTRIL) 10 MG tablet     Multiple Minerals-Vitamins (YAMILETH MAG ZINC +D3 PO)     No current facility-administered medications for this visit.            Family History:     Family History   Problem Relation Age of Onset     Hypertension Mother      Hypertension Father      Hypertension Brother      Diabetes Sister 30            Social History:     Social History     Socioeconomic History     Marital status:      Spouse name: Not on file     Number of children: Not on file     Years of education: Not on file     Highest education level: Not on file   Occupational History     Not on file   Tobacco Use     Smoking status: Never     Smokeless tobacco: Never   Substance and Sexual Activity     Alcohol use: No     Drug use: No     Sexual activity: Yes     Partners: Male   Other Topics Concern     Parent/sibling w/ CABG, MI or angioplasty before 65F 55M? Not Asked   Social History Narrative     Not on file     Social Determinants of Health     Financial Resource Strain: Low Risk  (1/15/2024)    Financial Resource Strain      Within the past 12 months, have you or your family members you live with been unable to get utilities (heat, electricity) when it was really needed?: No   Food Insecurity: Low Risk  (1/15/2024)    Food Insecurity      Within the past 12 months, did you worry that your food would run out before you got money to buy more?: No      Within the past 12 months, did the food you bought just not last and you didn t have money to get more?: No   Transportation Needs: Low Risk  (1/15/2024)    Transportation Needs      Within the past 12 months, has lack of transportation kept you from medical appointments, getting your medicines, non-medical meetings or appointments, work, or from getting things that you need?: No   Physical Activity: Not on file   Stress: Not on file   Social Connections: Not on  "file   Interpersonal Safety: Not on file   Housing Stability: Low Risk  (1/15/2024)    Housing Stability      Do you have housing? : Yes      Are you worried about losing your housing?: No            Allergies:   Patient has no known allergies.         Review of Systems:  From intake questionnaire     Negative 14 system review except as noted on HPI, nurse's note.         Physical Exam:     Patient is a 58 year old  female    Vitals: Blood pressure (!) 141/79, pulse 83, temperature 98.4  F (36.9  C), temperature source Tympanic, resp. rate 16, weight 52.2 kg (115 lb 1.6 oz), last menstrual period 03/28/2018, SpO2 99%, not currently breastfeeding. Body mass index is 21.05 kg/m .  General Appearance Adult: Alert, no acute distress, oriented  HENT: throat/mouth:normal, good dentition  Lungs: no respiratory distress, or pursed lip breathing  Heart: No obvious jugular venous distension present  Abdomen: soft, nontender, no organomegaly or masses, scars noted  Lymphatics: No cervical or supraclavicular adenopathy  Musculoskeltal: extremities normal, no peripheral edema  Skin: no visible suspicious lesions or rashes  Neuro: Alert, oriented, speech and mentation normal  Psych: affect and mood normal  Gait: Normal  : deferred to cystoscopy        Labs and Pathology:    I reviewed all applicable laboratory and pathology data and went over findings with patient  Significant for     Lab Results   Component Value Date    WBC 5.0 01/08/2024    WBC 4.5 08/31/2020     Lab Results   Component Value Date    RBC 4.45 01/08/2024    RBC 4.79 08/31/2020     Lab Results   Component Value Date    HGB 13.6 01/08/2024    HGB 14.5 08/31/2020     Lab Results   Component Value Date    HCT 41.8 01/08/2024    HCT 44.6 08/31/2020     No components found for: \"MCT\"  Lab Results   Component Value Date    MCV 94 01/08/2024    MCV 93 08/31/2020     Lab Results   Component Value Date    MCH 30.6 01/08/2024    MCH 30.3 08/31/2020     Lab Results "   Component Value Date    MCHC 32.5 01/08/2024    MCHC 32.5 08/31/2020     Lab Results   Component Value Date    RDW 12.1 01/08/2024    RDW 12.0 08/31/2020     Lab Results   Component Value Date     01/08/2024     08/31/2020       Last Comprehensive Metabolic Panel:  Sodium   Date Value Ref Range Status   01/08/2024 142 135 - 145 mmol/L Final     Comment:     Reference intervals for this test were updated on 09/26/2023 to more accurately reflect our healthy population. There may be differences in the flagging of prior results with similar values performed with this method. Interpretation of those prior results can be made in the context of the updated reference intervals.    08/31/2020 138 133 - 144 mmol/L Final     Potassium   Date Value Ref Range Status   01/08/2024 4.0 3.4 - 5.3 mmol/L Final   10/17/2022 3.8 3.4 - 5.3 mmol/L Final   08/31/2020 5.3 3.4 - 5.3 mmol/L Final     Chloride   Date Value Ref Range Status   01/08/2024 104 98 - 107 mmol/L Final   10/17/2022 105 94 - 109 mmol/L Final   08/31/2020 107 94 - 109 mmol/L Final     Carbon Dioxide   Date Value Ref Range Status   08/31/2020 27 20 - 32 mmol/L Final     Carbon Dioxide (CO2)   Date Value Ref Range Status   01/08/2024 27 22 - 29 mmol/L Final   10/17/2022 25 20 - 32 mmol/L Final     Anion Gap   Date Value Ref Range Status   01/08/2024 11 7 - 15 mmol/L Final   10/17/2022 8 3 - 14 mmol/L Final   08/31/2020 4 3 - 14 mmol/L Final     Glucose   Date Value Ref Range Status   01/08/2024 95 70 - 99 mg/dL Final   10/17/2022 98 70 - 99 mg/dL Final   08/31/2020 110 (H) 70 - 99 mg/dL Final     Comment:     Fasting specimen     Urea Nitrogen   Date Value Ref Range Status   01/08/2024 16.3 8.0 - 23.0 mg/dL Final   10/17/2022 13 7 - 30 mg/dL Final   08/31/2020 17 7 - 30 mg/dL Final     Creatinine   Date Value Ref Range Status   01/08/2024 0.61 0.51 - 0.95 mg/dL Final   08/31/2020 0.74 0.52 - 1.04 mg/dL Final     GFR Estimate   Date Value Ref Range Status    01/08/2024 >90 >60 mL/min/1.73m2 Final   08/31/2020 >90 >60 mL/min/[1.73_m2] Final     Comment:     Non  GFR Calc  Starting 12/18/2018, serum creatinine based estimated GFR (eGFR) will be   calculated using the Chronic Kidney Disease Epidemiology Collaboration   (CKD-EPI) equation.       Calcium   Date Value Ref Range Status   01/08/2024 9.3 8.6 - 10.0 mg/dL Final   08/31/2020 9.6 8.5 - 10.1 mg/dL Final     Bilirubin Total   Date Value Ref Range Status   01/08/2024 0.5 <=1.2 mg/dL Final   08/31/2020 0.4 0.2 - 1.3 mg/dL Final     Alkaline Phosphatase   Date Value Ref Range Status   01/08/2024 69 40 - 150 U/L Final     Comment:     Reference intervals for this test were updated on 11/14/2023 to more accurately reflect our healthy population. There may be differences in the flagging of prior results with similar values performed with this method. Interpretation of those prior results can be made in the context of the updated reference intervals.   08/31/2020 79 40 - 150 U/L Final     ALT   Date Value Ref Range Status   01/08/2024 12 0 - 50 U/L Final     Comment:     Reference intervals for this test were updated on 6/12/2023 to more accurately reflect our healthy population. There may be differences in the flagging of prior results with similar values performed with this method. Interpretation of those prior results can be made in the context of the updated reference intervals.     08/31/2020 23 0 - 50 U/L Final     AST   Date Value Ref Range Status   01/08/2024 22 0 - 45 U/L Final     Comment:     Reference intervals for this test were updated on 6/12/2023 to more accurately reflect our healthy population. There may be differences in the flagging of prior results with similar values performed with this method. Interpretation of those prior results can be made in the context of the updated reference intervals.   08/31/2020 18 0 - 45 U/L Final       INR/Prothrombin Time    Creatinine   Date Value Ref  Range Status   01/08/2024 0.61 0.51 - 0.95 mg/dL Final   08/31/2020 0.74 0.52 - 1.04 mg/dL Final          Imaging:    No imaging to review today    Cystoscopy procedure     After sterile preparation and draping of the patient,  a 16-French flexible cystoscope was introduced via the urethra.  It was passed without difficulty into the bladder.  The urethra was open without evidence of stricture.  The ureteral orifices were orthotopic.  The bladder mucosa was evaluated using both antegrade and retroflex views and this showed no evidence of any lesions or trabeculation.  Scope was then removed and patient tolerated the procedure well.     10 total minutes was spent on cystoscopy procedure alone.                 Assessment and Plan:     Assessment     58-year-old female with microscopic hematuria and unremarkable cystoscopy    The differential diagnosis for hematuria is broad, but the causes can be narrowed into distinct categories. The most important one to rule out would be malignancy including bladder, prostate and kidney malignancies. Other causes of hematuria include infection, stone, trauma, and medical renal disease.      We discussed essential components of a work up to rule out genitourinary tract malignancies including CT urogram and flexible cystoscopy. Cystoscopy today showed no abnormalities.     Patient refuses to have the CT scan.    Plan  Return to clinic as needed    45 total minutes spent on the date of the encounter including direct interaction with the patient, performing chart review, documentation and further activities as noted above, exclusive of the time spent on performing cystoscopy.         Paulo Dia MD   Department of Urology   [facility]       Nursing Note:  Anaya Toth presents today for cystoscopy.  Patient seen by provider today: Yes: .   present during visit today: Yes, Language: Libyan.     Note: N/A.    Labs:  Not Applicable.      Procedure:  Urology Procedure: Cystoscopy.    Verified:  Time out included verbal and active participation of all team members: Yes.      Post Procedure:  Patient tolerated the procedure without incident..    Post Pain Assessment: 0 out of 10.    Discharge Plan:   Departure Mode: Ambulatory.    Chetna Antonio CMA        Again, thank you for allowing me to participate in the care of your patient.        Sincerely,        Paulo Dia MD

## 2024-02-09 NOTE — PROGRESS NOTES
Urology clinic   Hematuria               Chief Complaint:   Hematuria           Consult or Referral:     Anaya Toth is a 58 year old female seen in consultation from Dr. Schreiber.         History of Present Illness:    Anaya Toth is a very pleasant 58 year old female who presents with a history of microscopic hematuria which was first noted 10 days ago.  She has not had similar symptoms in the past.There are not associated urinary symptoms.  she denies any flank or abdominal pain.  The hematuria is not associated with vigorous exercise.    Concerning typical risk factors for urinary tract malignancies, the patient does not have a family history of  malignancies.  She recalls that her mother had blood in her urine just before her passing.  She has not received pelvic radiation, exposure to known carcinogenic agents such as benzenes, aromatic amines, alkylating agents or chronic indwelling foreign bodies in the urinary tract in the past.  Furthermore she does not have a history of recurrent urinary tract infections in the past.    The patient is not currently smoking cigarettes.  she is not a former smoker.          Past Medical History:     Past Medical History:   Diagnosis Date    CKD (chronic kidney disease) stage 1, GFR 90 ml/min or greater     HTN, goal below 140/90     Syncope             Past Surgical History:     Past Surgical History:   Procedure Laterality Date    COLONOSCOPY      COLONOSCOPY N/A 6/26/2023    Procedure: Colonoscopy;  Surgeon: Deann Navarro MD;  Location:  GI    NO HISTORY OF SURGERY              Medications     Current Outpatient Medications   Medication    calcium citrate-vitamin D (CITRACAL) 200-6.25 MG-MCG TABS per tablet    cetirizine-psuedoePHEDrine (ZYRTEC-D) 5-120 MG per tablet    lisinopril (ZESTRIL) 10 MG tablet    Multiple Minerals-Vitamins (YAMILETH MAG ZINC +D3 PO)     No current facility-administered medications for this visit.            Family History:      Family History   Problem Relation Age of Onset    Hypertension Mother     Hypertension Father     Hypertension Brother     Diabetes Sister 30            Social History:     Social History     Socioeconomic History    Marital status:      Spouse name: Not on file    Number of children: Not on file    Years of education: Not on file    Highest education level: Not on file   Occupational History    Not on file   Tobacco Use    Smoking status: Never    Smokeless tobacco: Never   Substance and Sexual Activity    Alcohol use: No    Drug use: No    Sexual activity: Yes     Partners: Male   Other Topics Concern    Parent/sibling w/ CABG, MI or angioplasty before 65F 55M? Not Asked   Social History Narrative    Not on file     Social Determinants of Health     Financial Resource Strain: Low Risk  (1/15/2024)    Financial Resource Strain     Within the past 12 months, have you or your family members you live with been unable to get utilities (heat, electricity) when it was really needed?: No   Food Insecurity: Low Risk  (1/15/2024)    Food Insecurity     Within the past 12 months, did you worry that your food would run out before you got money to buy more?: No     Within the past 12 months, did the food you bought just not last and you didn t have money to get more?: No   Transportation Needs: Low Risk  (1/15/2024)    Transportation Needs     Within the past 12 months, has lack of transportation kept you from medical appointments, getting your medicines, non-medical meetings or appointments, work, or from getting things that you need?: No   Physical Activity: Not on file   Stress: Not on file   Social Connections: Not on file   Interpersonal Safety: Not on file   Housing Stability: Low Risk  (1/15/2024)    Housing Stability     Do you have housing? : Yes     Are you worried about losing your housing?: No            Allergies:   Patient has no known allergies.         Review of Systems:  From intake questionnaire  "    Negative 14 system review except as noted on HPI, nurse's note.         Physical Exam:     Patient is a 58 year old  female    Vitals: Blood pressure (!) 141/79, pulse 83, temperature 98.4  F (36.9  C), temperature source Tympanic, resp. rate 16, weight 52.2 kg (115 lb 1.6 oz), last menstrual period 03/28/2018, SpO2 99%, not currently breastfeeding. Body mass index is 21.05 kg/m .  General Appearance Adult: Alert, no acute distress, oriented  HENT: throat/mouth:normal, good dentition  Lungs: no respiratory distress, or pursed lip breathing  Heart: No obvious jugular venous distension present  Abdomen: soft, nontender, no organomegaly or masses, scars noted  Lymphatics: No cervical or supraclavicular adenopathy  Musculoskeltal: extremities normal, no peripheral edema  Skin: no visible suspicious lesions or rashes  Neuro: Alert, oriented, speech and mentation normal  Psych: affect and mood normal  Gait: Normal  : deferred to cystoscopy        Labs and Pathology:    I reviewed all applicable laboratory and pathology data and went over findings with patient  Significant for     Lab Results   Component Value Date    WBC 5.0 01/08/2024    WBC 4.5 08/31/2020     Lab Results   Component Value Date    RBC 4.45 01/08/2024    RBC 4.79 08/31/2020     Lab Results   Component Value Date    HGB 13.6 01/08/2024    HGB 14.5 08/31/2020     Lab Results   Component Value Date    HCT 41.8 01/08/2024    HCT 44.6 08/31/2020     No components found for: \"MCT\"  Lab Results   Component Value Date    MCV 94 01/08/2024    MCV 93 08/31/2020     Lab Results   Component Value Date    MCH 30.6 01/08/2024    MCH 30.3 08/31/2020     Lab Results   Component Value Date    MCHC 32.5 01/08/2024    MCHC 32.5 08/31/2020     Lab Results   Component Value Date    RDW 12.1 01/08/2024    RDW 12.0 08/31/2020     Lab Results   Component Value Date     01/08/2024     08/31/2020       Last Comprehensive Metabolic Panel:  Sodium   Date Value " Ref Range Status   01/08/2024 142 135 - 145 mmol/L Final     Comment:     Reference intervals for this test were updated on 09/26/2023 to more accurately reflect our healthy population. There may be differences in the flagging of prior results with similar values performed with this method. Interpretation of those prior results can be made in the context of the updated reference intervals.    08/31/2020 138 133 - 144 mmol/L Final     Potassium   Date Value Ref Range Status   01/08/2024 4.0 3.4 - 5.3 mmol/L Final   10/17/2022 3.8 3.4 - 5.3 mmol/L Final   08/31/2020 5.3 3.4 - 5.3 mmol/L Final     Chloride   Date Value Ref Range Status   01/08/2024 104 98 - 107 mmol/L Final   10/17/2022 105 94 - 109 mmol/L Final   08/31/2020 107 94 - 109 mmol/L Final     Carbon Dioxide   Date Value Ref Range Status   08/31/2020 27 20 - 32 mmol/L Final     Carbon Dioxide (CO2)   Date Value Ref Range Status   01/08/2024 27 22 - 29 mmol/L Final   10/17/2022 25 20 - 32 mmol/L Final     Anion Gap   Date Value Ref Range Status   01/08/2024 11 7 - 15 mmol/L Final   10/17/2022 8 3 - 14 mmol/L Final   08/31/2020 4 3 - 14 mmol/L Final     Glucose   Date Value Ref Range Status   01/08/2024 95 70 - 99 mg/dL Final   10/17/2022 98 70 - 99 mg/dL Final   08/31/2020 110 (H) 70 - 99 mg/dL Final     Comment:     Fasting specimen     Urea Nitrogen   Date Value Ref Range Status   01/08/2024 16.3 8.0 - 23.0 mg/dL Final   10/17/2022 13 7 - 30 mg/dL Final   08/31/2020 17 7 - 30 mg/dL Final     Creatinine   Date Value Ref Range Status   01/08/2024 0.61 0.51 - 0.95 mg/dL Final   08/31/2020 0.74 0.52 - 1.04 mg/dL Final     GFR Estimate   Date Value Ref Range Status   01/08/2024 >90 >60 mL/min/1.73m2 Final   08/31/2020 >90 >60 mL/min/[1.73_m2] Final     Comment:     Non  GFR Calc  Starting 12/18/2018, serum creatinine based estimated GFR (eGFR) will be   calculated using the Chronic Kidney Disease Epidemiology Collaboration   (CKD-EPI) equation.        Calcium   Date Value Ref Range Status   01/08/2024 9.3 8.6 - 10.0 mg/dL Final   08/31/2020 9.6 8.5 - 10.1 mg/dL Final     Bilirubin Total   Date Value Ref Range Status   01/08/2024 0.5 <=1.2 mg/dL Final   08/31/2020 0.4 0.2 - 1.3 mg/dL Final     Alkaline Phosphatase   Date Value Ref Range Status   01/08/2024 69 40 - 150 U/L Final     Comment:     Reference intervals for this test were updated on 11/14/2023 to more accurately reflect our healthy population. There may be differences in the flagging of prior results with similar values performed with this method. Interpretation of those prior results can be made in the context of the updated reference intervals.   08/31/2020 79 40 - 150 U/L Final     ALT   Date Value Ref Range Status   01/08/2024 12 0 - 50 U/L Final     Comment:     Reference intervals for this test were updated on 6/12/2023 to more accurately reflect our healthy population. There may be differences in the flagging of prior results with similar values performed with this method. Interpretation of those prior results can be made in the context of the updated reference intervals.     08/31/2020 23 0 - 50 U/L Final     AST   Date Value Ref Range Status   01/08/2024 22 0 - 45 U/L Final     Comment:     Reference intervals for this test were updated on 6/12/2023 to more accurately reflect our healthy population. There may be differences in the flagging of prior results with similar values performed with this method. Interpretation of those prior results can be made in the context of the updated reference intervals.   08/31/2020 18 0 - 45 U/L Final       INR/Prothrombin Time    Creatinine   Date Value Ref Range Status   01/08/2024 0.61 0.51 - 0.95 mg/dL Final   08/31/2020 0.74 0.52 - 1.04 mg/dL Final          Imaging:    No imaging to review today    Cystoscopy procedure     After sterile preparation and draping of the patient,  a 16-Ukrainian flexible cystoscope was introduced via the urethra.  It was  passed without difficulty into the bladder.  The urethra was open without evidence of stricture.  The ureteral orifices were orthotopic.  The bladder mucosa was evaluated using both antegrade and retroflex views and this showed no evidence of any lesions or trabeculation.  Scope was then removed and patient tolerated the procedure well.     10 total minutes was spent on cystoscopy procedure alone.                 Assessment and Plan:     Assessment     58-year-old female with microscopic hematuria and unremarkable cystoscopy    The differential diagnosis for hematuria is broad, but the causes can be narrowed into distinct categories. The most important one to rule out would be malignancy including bladder, prostate and kidney malignancies. Other causes of hematuria include infection, stone, trauma, and medical renal disease.      We discussed essential components of a work up to rule out genitourinary tract malignancies including CT urogram and flexible cystoscopy. Cystoscopy today showed no abnormalities.     Patient refuses to have the CT scan.    Plan  Return to clinic as needed    45 total minutes spent on the date of the encounter including direct interaction with the patient, performing chart review, documentation and further activities as noted above, exclusive of the time spent on performing cystoscopy.         Paulo Dia MD   Department of Urology   [facility]

## 2024-02-09 NOTE — NURSING NOTE
"Oncology Rooming Note    February 9, 2024 7:48 AM   Anaya Toth is a 58 year old female who presents for:    Chief Complaint   Patient presents with    Oncology Clinic Visit     Initial Vitals: BP (!) 141/79   Pulse 83   Temp 98.4  F (36.9  C) (Tympanic)   Resp 16   Wt 52.2 kg (115 lb 1.6 oz)   LMP 03/28/2018 (LMP Unknown)   SpO2 99%   BMI 21.05 kg/m   Estimated body mass index is 21.05 kg/m  as calculated from the following:    Height as of 1/15/24: 1.575 m (5' 2\").    Weight as of this encounter: 52.2 kg (115 lb 1.6 oz). Body surface area is 1.51 meters squared.  Mild Pain (3) Comment: Data Unavailable   Patient's last menstrual period was 03/28/2018 (lmp unknown).  Allergies reviewed: Yes  Medications reviewed: Yes    Medications: Medication refills not needed today.  Pharmacy name entered into [facility]: [id number] - [facility] [address] [address]    Frailty Screening:   Is the patient here for a new oncology consult visit in cancer care? 2. No      Clinical concerns: new patient       Chetna Antonio CMA              "

## 2024-05-01 ENCOUNTER — NURSE TRIAGE (OUTPATIENT)
Dept: FAMILY MEDICINE | Facility: CLINIC | Age: 59
End: 2024-05-01
Payer: COMMERCIAL

## 2024-05-01 NOTE — TELEPHONE ENCOUNTER
Huddled with Dr. Raymond, PCP is [facility]OO. Advised Pt should be seen next day or two. Advised Pt check BP for hypotension. Called daughter (verbal permission given previous call) and advised to have Mom check BP and call is lower than 100/60. Made appointment for tomorrow AM.    Rk Garcia RN [place] Triage

## 2024-05-01 NOTE — TELEPHONE ENCOUNTER
Nurse Triage SBAR    Is this a 2nd Level Triage? YES, LICENSED PRACTITIONER REVIEW IS REQUIRED    Situation: Pt calling with her daughter for concern of dizziness.     Background: Pt has similar symptoms in October while in [place] but they resolved.     Assessment: Dizziness and spinning sensation while laying and rolling from one side to the other and when first standing from bed. Lasts only a few seconds then resolves. No issues with balance nor does she feel faint. Pt takes Lisinopril 10 mg daily.     Protocol Recommended Disposition:   Discuss With PCP And Callback By Nurse Today    Recommendation: Please advise time frame for appointment     Routed to provider    Does the patient meet one of the following criteria for ADS visit consideration? 16+ years old, with an [facility] PCP     TIP  Providers, please consider if this condition is appropriate for management at one of our Acute and Diagnostic Services sites.     If patient is a good candidate, please use dotphrase <dot>triageresponse and select Refer to ADS to document.     Reason for Disposition   Taking a medicine that could cause dizziness (e.g., blood pressure medications, diuretics)    Additional Information   Negative: SEVERE difficulty breathing (e.g., struggling for each breath, speaks in single words)   Negative: Shock suspected (e.g., cold/pale/clammy skin, too weak to stand, low BP, rapid pulse)   Negative: Difficult to awaken or acting confused (e.g., disoriented, slurred speech)   Negative: Fainted, and still feels dizzy afterwards   Negative: Overdose (accidental or intentional) of medications   Negative: New neurologic deficit that is present now: * Weakness of the face, arm, or leg on one side of the body * Numbness of the face, arm, or leg on one side of the body * Loss of speech or garbled speech   Negative: Heart beating < 50 beats per minute OR > 140 beats per minute   Negative: Sounds like a life-threatening emergency to the triager    "Negative: Chest pain   Negative: Rectal bleeding, bloody stool, or tarry-black stool   Negative: Vomiting is main symptom   Negative: Diarrhea is main symptom   Negative: Headache is main symptom   Negative: Heat exhaustion suspected (i.e., dehydration from heat exposure)   Negative: Patient states that they are having an anxiety or panic attack   Negative: Dizziness from low blood sugar (i.e., < 60 mg/dl or 3.5 mmol/l)   Negative: SEVERE dizziness (e.g., unable to stand, requires support to walk, feels like passing out now)   Negative: SEVERE headache or neck pain   Negative: Spinning or tilting sensation (vertigo) present now and one or more stroke risk factors (i.e., hypertension, diabetes mellitus, prior stroke/TIA, heart attack, age over 60) (Exception: Prior physician evaluation for this AND no different/worse than usual.)   Negative: Neurologic deficit that was brief (now gone), ANY of the following:* Weakness of the face, arm, or leg on one side of the body* Numbness of the face, arm, or leg on one side of the body* Loss of speech or garbled speech   Negative: Loss of vision or double vision  (Exception: Similar to previous migraines.)   Negative: Extra heartbeats, irregular heart beating, or heart is beating very fast (i.e., 'palpitations')   Negative: Difficulty breathing   Negative: Drinking very little and dehydration suspected (e.g., no urine > 12 hours, very dry mouth, very lightheaded)   Negative: Follows bleeding (e.g., stomach, rectum, vagina)  (Exception: Became dizzy from sight of small amount blood.)   Negative: Patient sounds very sick or weak to the triager   Negative: MODERATE dizziness (e.g., interferes with normal activities)  (Exception: Dizziness caused by heat exposure, sudden standing, or poor fluid intake.)   Negative: Vomiting occurs with dizziness   Negative: Patient wants to be seen    Answer Assessment - Initial Assessment Questions  1. DESCRIPTION: \"Describe your dizziness.\"      " "Spinning   2. LIGHTHEADED: \"Do you feel lightheaded?\" (e.g., somewhat faint, woozy, weak upon standing)      No  3. VERTIGO: \"Do you feel like either you or the room is spinning or tilting?\" (i.e. vertigo)      Yes for a few seconds   4. SEVERITY: \"How bad is it?\"  \"Do you feel like you are going to faint?\" \"Can you stand and walk?\"    - MILD: Feels slightly dizzy, but walking normally.    - MODERATE: Feels unsteady when walking, but not falling; interferes with normal activities (e.g., school, work).    - SEVERE: Unable to walk without falling, or requires assistance to walk without falling; feels like passing out now.       Rolling in bed, no issues with walking or standing   5. ONSET:  \"When did the dizziness begin?\"      Couple weeks ago   6. AGGRAVATING FACTORS: \"Does anything make it worse?\" (e.g., standing, change in head position)      Rolling in bed  7. HEART RATE: \"Can you tell me your heart rate?\" \"How many beats in 15 seconds?\"  (Note: not all patients can do this)        N/A  8. CAUSE: \"What do you think is causing the dizziness?\"      Unsure   9. RECURRENT SYMPTOM: \"Have you had dizziness before?\" If Yes, ask: \"When was the last time?\" \"What happened that time?\"      One time had it in [place] past October but went away  10. OTHER SYMPTOMS: \"Do you have any other symptoms?\" (e.g., fever, chest pain, vomiting, diarrhea, bleeding)        No  11. PREGNANCY: \"Is there any chance you are pregnant?\" \"When was your last menstrual period?\"        N/A    Protocols used: Dizziness-A-[place]  [phone] Thu  "

## 2024-05-02 ENCOUNTER — OFFICE VISIT (OUTPATIENT)
Dept: FAMILY MEDICINE | Facility: CLINIC | Age: 59
End: 2024-05-02
Payer: COMMERCIAL

## 2024-05-02 VITALS
HEART RATE: 87 BPM | SYSTOLIC BLOOD PRESSURE: 122 MMHG | OXYGEN SATURATION: 100 % | DIASTOLIC BLOOD PRESSURE: 70 MMHG | TEMPERATURE: 97.4 F | BODY MASS INDEX: 20.85 KG/M2 | WEIGHT: 114 LBS

## 2024-05-02 DIAGNOSIS — H81.10 BENIGN PAROXYSMAL POSITIONAL VERTIGO, UNSPECIFIED LATERALITY: Primary | ICD-10-CM

## 2024-05-02 PROCEDURE — 99214 OFFICE O/P EST MOD 30 MIN: CPT | Performed by: NURSE PRACTITIONER

## 2024-05-02 RX ORDER — MECLIZINE HYDROCHLORIDE 25 MG/1
25 TABLET ORAL 3 TIMES DAILY PRN
Qty: 30 TABLET | Refills: 0 | Status: SHIPPED | OUTPATIENT
Start: 2024-05-02

## 2024-05-02 NOTE — PROGRESS NOTES
Assessment & Plan     Benign paroxysmal positional vertigo, unspecified laterality  Symptoms resolved today. Seem vestibular in nature given movement relation. Recommend if return to see PT and may use meclizine PRN. Also given printout with at home maneuvers she can try first if desired.   - Physical Therapy  Referral  - meclizine (ANTIVERT) 25 MG tablet  Dispense: 30 tablet; Refill: 0      Prescription drug management      Subjective   Anaya is a 58 year old, presenting for the following health issues:  Dizziness        5/2/2024     7:52 AM   Additional Questions   Roomed by Lars HILARIO CMA     [facility]     Hypertension Follow-up    Do you check your blood pressure regularly outside of the clinic? Yes   Are you following a low salt diet? Yes  Are your blood pressures ever more than 140 on the top number (systolic) OR more   than 90 on the bottom number (diastolic), for example 140/90? No    BP Readings from Last 2 Encounters:   05/02/24 122/70   02/09/24 (!) 141/79     Hemoglobin A1C (%)   Date Value   08/31/2020 6.4 (H)     LDL Cholesterol Calculated (mg/dL)   Date Value   01/08/2024 119 (H)   10/17/2022 85   08/31/2020 122 (H)   08/28/2019 100 (H)     Dizziness  Onset/Duration: 2-3 weeks, but has increased over the last week, dizziness lasting only for a couple seconds when she is laying down on her left side.   Description:   Do you feel faint: No  Does it feel like the surroundings (bed, room) are moving: YES, getting our of bed is worse  Unsteady/off balance: No  Have you passed out or fallen: No  Intensity: mild- frequency ha increase a lot over the past week  Progression of Symptoms: worsening and intermittent  Accompanying Signs & Symptoms:  Heart palpitations or chest pain: No  Nausea, vomiting: No  Weakness or lack of coordination in arms or legs: No  Vision or speech changes: No  Numbness or tingling: No  Ringing in ears (Tinnitus): No  Hearing Loss: No  History:   Head trauma/concussion  history: No  Previous similar symptoms: YES- happened about 1 year ago, had MRI completed 2023  Recent bleeding history: No  Any new medications (BP?): No  Precipitating factors:   Worse with activity: YES  Worse with head movement: YES  Alleviating factors:   Does staying in a fixed position give relief: YES- sometimes  Therapies tried and outcome: None    Last year had increased stress due to dad's death. Occurred at that time and was in home country had CT scan was normal. Previously had spinning with this episode which is not happening this time. Just mostly episodic dizziness with movement this time.     No change in BP at home it is 120/70-80. No anemia history. Recent labs are normal.         Constitutional, HEENT, cardiovascular, pulmonary, GI, , musculoskeletal, neuro, skin, endocrine and psych systems are negative, except as otherwise noted.        Objective    /70   Pulse 87   Temp 97.4  F (36.3  C) (Tympanic)   Wt 51.7 kg (114 lb)   LMP 03/28/2018 (LMP Unknown)   SpO2 100%   BMI 20.85 kg/m    Body mass index is 20.85 kg/m .  Physical Exam   GENERAL: alert and no distress  NECK: no adenopathy, no asymmetry, masses, or scars  RESP: lungs clear to auscultation - no rales, rhonchi or wheezes  CV: regular rate and rhythm, normal S1 S2, no S3 or S4, no murmur, click or rub, no peripheral edema  ABDOMEN: soft, nontender, no hepatosplenomegaly, no masses and bowel sounds normal  MS: no gross musculoskeletal defects noted, no edema  Epley negative at this time-no dizziness today so far.           Signed Electronically by: Rosario Schreiber CNP

## 2024-07-07 ENCOUNTER — HEALTH MAINTENANCE LETTER (OUTPATIENT)
Age: 59
End: 2024-07-07

## 2024-08-01 ENCOUNTER — PATIENT OUTREACH (OUTPATIENT)
Dept: ONCOLOGY | Facility: CLINIC | Age: 59
End: 2024-08-01
Payer: COMMERCIAL

## 2024-08-01 ENCOUNTER — HOSPITAL ENCOUNTER (OUTPATIENT)
Dept: MAMMOGRAPHY | Facility: CLINIC | Age: 59
Discharge: HOME | End: 2024-08-01
Attending: NURSE PRACTITIONER | Admitting: NURSE PRACTITIONER
Payer: COMMERCIAL

## 2024-08-01 DIAGNOSIS — Z12.31 ENCOUNTER FOR SCREENING MAMMOGRAM FOR MALIGNANT NEOPLASM OF BREAST: ICD-10-CM

## 2024-08-01 DIAGNOSIS — R31.29 MICROSCOPIC HEMATURIA: Primary | ICD-10-CM

## 2024-08-01 PROCEDURE — 77063 BREAST TOMOSYNTHESIS BI: CPT

## 2024-08-01 NOTE — TELEPHONE ENCOUNTER
[facility]: Urology                                                                                         Writer called patient using [employer] interpretor to discuss reason for visit. Per patient blood reported in recent UA again and doctor told her to touch base with Dr. Dia. Writer will update Dr. Dia about ordering CT Urogram as suggested at last visit. Visit cancelled for 8/2/24 and will be rescheduled once CT is obtained.     Will call patient when orders are placed.     Nader Barnes, RN, BSN.  RN Care Coordinator     [facility]   [phone]

## 2024-08-06 NOTE — TELEPHONE ENCOUNTER
[facility]: Urology                                                                                  Per Dr. Dia writer to order CT urogram for patient to complete full work up for hematuria. Writer notified scheduling to assist patient in scheduling CT urogram. Dr. Dia to call patient with abnormal results and no need for return visit. Writer placed verbal orders on behalf of Dr. Dia for CT urogram.     Paulo Dia MD Kraus, Tanner, RN  No need to schedule an appointment with me.  Lets get it done and I will call her with the results.  Thanks      Nader Barnes, RN, BSN.  RN Care Coordinator     [facility]   [phone]

## 2024-08-21 ENCOUNTER — HOSPITAL ENCOUNTER (OUTPATIENT)
Dept: CT IMAGING | Facility: CLINIC | Age: 59
Discharge: HOME | End: 2024-08-21
Attending: UROLOGY | Admitting: UROLOGY
Payer: COMMERCIAL

## 2024-08-21 DIAGNOSIS — R31.29 MICROSCOPIC HEMATURIA: ICD-10-CM

## 2024-08-21 PROCEDURE — 250N000009 HC RX 250: Performed by: UROLOGY

## 2024-08-21 PROCEDURE — 74178 CT ABD&PLV WO CNTR FLWD CNTR: CPT

## 2024-08-21 PROCEDURE — 250N000011 HC RX IP 250 OP 636: Performed by: UROLOGY

## 2024-08-21 RX ORDER — IOPAMIDOL 755 MG/ML
500 INJECTION, SOLUTION INTRAVASCULAR ONCE
Status: COMPLETED | OUTPATIENT
Start: 2024-08-21 | End: 2024-08-21

## 2024-08-21 RX ADMIN — IOPAMIDOL 58 ML: 755 INJECTION, SOLUTION INTRAVENOUS at 08:53

## 2024-08-21 RX ADMIN — SODIUM CHLORIDE 60 ML: 9 INJECTION, SOLUTION INTRAVENOUS at 08:53

## 2025-02-25 ENCOUNTER — TELEPHONE (OUTPATIENT)
Dept: FAMILY MEDICINE | Facility: CLINIC | Age: 60
End: 2025-02-25
Payer: COMMERCIAL

## 2025-02-25 NOTE — TELEPHONE ENCOUNTER
FYI - Status Update    Who is Calling: family member, Daughter     Update:  Would like the orders placed for her yearly labs will come in week before     Does caller want a call/response back: No

## 2025-03-10 ENCOUNTER — DOCUMENTATION ONLY (OUTPATIENT)
Dept: FAMILY MEDICINE | Facility: CLINIC | Age: 60
End: 2025-03-10
Payer: COMMERCIAL

## 2025-03-10 DIAGNOSIS — N18.1 CKD (CHRONIC KIDNEY DISEASE) STAGE 1, GFR 90 ML/MIN OR GREATER: ICD-10-CM

## 2025-03-10 DIAGNOSIS — Z13.1 SCREENING FOR DIABETES MELLITUS: ICD-10-CM

## 2025-03-10 DIAGNOSIS — Z13.21 ENCOUNTER FOR VITAMIN DEFICIENCY SCREENING: ICD-10-CM

## 2025-03-10 DIAGNOSIS — I10 HTN, GOAL BELOW 140/90: Primary | ICD-10-CM

## 2025-03-10 DIAGNOSIS — Z13.29 THYROID DISORDER SCREENING: ICD-10-CM

## 2025-03-10 DIAGNOSIS — Z13.220 SCREENING CHOLESTEROL LEVEL: ICD-10-CM

## 2025-03-10 DIAGNOSIS — Z13.0 SCREENING FOR DEFICIENCY ANEMIA: ICD-10-CM

## 2025-03-10 NOTE — PROGRESS NOTES
Anaya Toth has an upcoming lab appointment:    Future Appointments   Date Time Provider Department Center   3/16/2025  9:00 AM [facility]   3/21/2025  7:00 AM Rosario Schreiber, CNP [facility]     Patient is scheduled for the following lab(s): Annual lab tests per patient request.     There is no order available. Please review and place either future orders or HMPO (Review of Health Maintenance Protocol Orders), as appropriate. If you prefer to see patient first, please have your team reach out to the patient.    Health Maintenance Due   Topic    BMP     LIPID     MICROALBUMIN     ANNUAL REVIEW OF HM ORDERS      Nisa Penny

## 2025-03-16 ENCOUNTER — MYC REFILL (OUTPATIENT)
Dept: FAMILY MEDICINE | Facility: CLINIC | Age: 60
End: 2025-03-16

## 2025-03-16 ENCOUNTER — LAB (OUTPATIENT)
Dept: LAB | Facility: CLINIC | Age: 60
End: 2025-03-16
Payer: COMMERCIAL

## 2025-03-16 DIAGNOSIS — I10 HTN, GOAL BELOW 140/90: ICD-10-CM

## 2025-03-16 DIAGNOSIS — Z13.0 SCREENING FOR DEFICIENCY ANEMIA: ICD-10-CM

## 2025-03-16 DIAGNOSIS — N18.1 CKD (CHRONIC KIDNEY DISEASE) STAGE 1, GFR 90 ML/MIN OR GREATER: ICD-10-CM

## 2025-03-16 DIAGNOSIS — Z13.21 ENCOUNTER FOR VITAMIN DEFICIENCY SCREENING: ICD-10-CM

## 2025-03-16 DIAGNOSIS — Z13.29 THYROID DISORDER SCREENING: ICD-10-CM

## 2025-03-16 DIAGNOSIS — Z13.220 SCREENING CHOLESTEROL LEVEL: ICD-10-CM

## 2025-03-16 DIAGNOSIS — Z13.1 SCREENING FOR DIABETES MELLITUS: ICD-10-CM

## 2025-03-16 LAB
CHOLEST SERPL-MCNC: 226 MG/DL
ERYTHROCYTE [DISTWIDTH] IN BLOOD BY AUTOMATED COUNT: 12.2 % (ref 10–15)
FASTING STATUS PATIENT QL REPORTED: YES
HCT VFR BLD AUTO: 44.2 % (ref 35–47)
HDLC SERPL-MCNC: 57 MG/DL
HGB BLD-MCNC: 14.3 G/DL (ref 11.7–15.7)
LDLC SERPL CALC-MCNC: 139 MG/DL
MCH RBC QN AUTO: 30.3 PG (ref 26.5–33)
MCHC RBC AUTO-ENTMCNC: 32.4 G/DL (ref 31.5–36.5)
MCV RBC AUTO: 94 FL (ref 78–100)
NONHDLC SERPL-MCNC: 169 MG/DL
PLATELET # BLD AUTO: 230 10E3/UL (ref 150–450)
RBC # BLD AUTO: 4.72 10E6/UL (ref 3.8–5.2)
TRIGL SERPL-MCNC: 152 MG/DL
TSH SERPL DL<=0.005 MIU/L-ACNC: 1.4 UIU/ML (ref 0.3–4.2)
WBC # BLD AUTO: 4.8 10E3/UL (ref 4–11)

## 2025-03-16 PROCEDURE — 82306 VITAMIN D 25 HYDROXY: CPT

## 2025-03-16 PROCEDURE — 36415 COLL VENOUS BLD VENIPUNCTURE: CPT

## 2025-03-16 PROCEDURE — 80061 LIPID PANEL: CPT

## 2025-03-16 PROCEDURE — 85027 COMPLETE CBC AUTOMATED: CPT

## 2025-03-16 PROCEDURE — 84443 ASSAY THYROID STIM HORMONE: CPT

## 2025-03-17 LAB
ALBUMIN SERPL BCG-MCNC: 4.4 G/DL (ref 3.5–5.2)
ALP SERPL-CCNC: 85 U/L (ref 40–150)
ALT SERPL W P-5'-P-CCNC: 17 U/L (ref 0–50)
ANION GAP SERPL CALCULATED.3IONS-SCNC: 19 MMOL/L (ref 7–15)
AST SERPL W P-5'-P-CCNC: 29 U/L (ref 0–45)
BILIRUB SERPL-MCNC: 0.5 MG/DL
BUN SERPL-MCNC: 13.7 MG/DL (ref 8–23)
CALCIUM SERPL-MCNC: ABNORMAL MG/DL
CHLORIDE SERPL-SCNC: 102 MMOL/L (ref 98–107)
CREAT SERPL-MCNC: 0.66 MG/DL (ref 0.51–0.95)
EGFRCR SERPLBLD CKD-EPI 2021: >90 ML/MIN/1.73M2
FASTING STATUS PATIENT QL REPORTED: ABNORMAL
GLUCOSE SERPL-MCNC: 113 MG/DL (ref 70–99)
HCO3 SERPL-SCNC: 19 MMOL/L (ref 22–29)
Lab: NORMAL
PERFORMING LABORATORY: NORMAL
POTASSIUM SERPL-SCNC: 4.7 MMOL/L (ref 3.4–5.3)
PROT SERPL-MCNC: 7.9 G/DL (ref 6.4–8.3)
SODIUM SERPL-SCNC: 140 MMOL/L (ref 135–145)
SPECIMEN STATUS: NORMAL
TEST NAME: NORMAL
VIT D+METAB SERPL-MCNC: 24 NG/ML (ref 20–50)

## 2025-03-17 RX ORDER — LISINOPRIL 10 MG/1
10 TABLET ORAL DAILY
Qty: 90 TABLET | Refills: 0 | OUTPATIENT
Start: 2025-03-17

## 2025-03-18 LAB — MISCELLANEOUS TEST 1 (ARUP): NORMAL

## 2025-03-20 ENCOUNTER — APPOINTMENT (OUTPATIENT)
Dept: INTERPRETER SERVICES | Facility: CLINIC | Age: 60
End: 2025-03-20
Payer: COMMERCIAL

## (undated) RX ORDER — FENTANYL CITRATE 50 UG/ML
INJECTION, SOLUTION INTRAMUSCULAR; INTRAVENOUS
Status: DISPENSED
Start: 2023-06-26